# Patient Record
Sex: MALE | Race: WHITE | NOT HISPANIC OR LATINO | Employment: FULL TIME | URBAN - NONMETROPOLITAN AREA
[De-identification: names, ages, dates, MRNs, and addresses within clinical notes are randomized per-mention and may not be internally consistent; named-entity substitution may affect disease eponyms.]

---

## 2017-02-20 ENCOUNTER — COMMUNICATION - GICH (OUTPATIENT)
Dept: FAMILY MEDICINE | Facility: OTHER | Age: 62
End: 2017-02-20

## 2017-02-20 DIAGNOSIS — E78.00 PURE HYPERCHOLESTEROLEMIA: ICD-10-CM

## 2017-03-09 ENCOUNTER — COMMUNICATION - GICH (OUTPATIENT)
Dept: FAMILY MEDICINE | Facility: OTHER | Age: 62
End: 2017-03-09

## 2017-03-09 DIAGNOSIS — E78.00 PURE HYPERCHOLESTEROLEMIA: ICD-10-CM

## 2017-03-09 DIAGNOSIS — Z12.5 ENCOUNTER FOR SCREENING FOR MALIGNANT NEOPLASM OF PROSTATE: ICD-10-CM

## 2017-03-14 ENCOUNTER — AMBULATORY - GICH (OUTPATIENT)
Dept: LAB | Facility: OTHER | Age: 62
End: 2017-03-14

## 2017-03-14 DIAGNOSIS — E78.00 PURE HYPERCHOLESTEROLEMIA: ICD-10-CM

## 2017-03-14 DIAGNOSIS — Z12.5 ENCOUNTER FOR SCREENING FOR MALIGNANT NEOPLASM OF PROSTATE: ICD-10-CM

## 2017-03-14 LAB
A/G RATIO - HISTORICAL: 1.4 (ref 1–2)
ALBUMIN SERPL-MCNC: 4.2 G/DL (ref 3.5–5.7)
ALP SERPL-CCNC: 47 IU/L (ref 34–104)
ALT (SGPT) - HISTORICAL: 20 IU/L (ref 7–52)
ANION GAP - HISTORICAL: 9 (ref 5–18)
AST SERPL-CCNC: 22 IU/L (ref 13–39)
BILIRUB SERPL-MCNC: 2.2 MG/DL (ref 0.3–1)
BUN SERPL-MCNC: 13 MG/DL (ref 7–25)
BUN/CREAT RATIO - HISTORICAL: 15
CALCIUM SERPL-MCNC: 9.3 MG/DL (ref 8.6–10.3)
CHLORIDE SERPLBLD-SCNC: 105 MMOL/L (ref 98–107)
CHOL/HDL RATIO - HISTORICAL: 3.58
CHOLESTEROL TOTAL: 154 MG/DL
CO2 SERPL-SCNC: 24 MMOL/L (ref 21–31)
CREAT SERPL-MCNC: 0.89 MG/DL (ref 0.7–1.3)
GFR IF NOT AFRICAN AMERICAN - HISTORICAL: >60 ML/MIN/1.73M2
GLOBULIN - HISTORICAL: 3 G/DL (ref 2–3.7)
GLUCOSE SERPL-MCNC: 95 MG/DL (ref 70–105)
HDLC SERPL-MCNC: 43 MG/DL (ref 23–92)
LDLC SERPL CALC-MCNC: 95 MG/DL
NON-HDL CHOLESTEROL - HISTORICAL: 111 MG/DL
PATIENT STATUS - HISTORICAL: NORMAL
POTASSIUM SERPL-SCNC: 3.9 MMOL/L (ref 3.5–5.1)
PROT SERPL-MCNC: 7.2 G/DL (ref 6.4–8.9)
PSA TOTAL (DIAGNOSTIC) - HISTORICAL: 1.42 NG/ML
SODIUM SERPL-SCNC: 138 MMOL/L (ref 133–143)
TRIGL SERPL-MCNC: 82 MG/DL

## 2017-03-15 ENCOUNTER — OFFICE VISIT - GICH (OUTPATIENT)
Dept: FAMILY MEDICINE | Facility: OTHER | Age: 62
End: 2017-03-15

## 2017-03-15 ENCOUNTER — HISTORY (OUTPATIENT)
Dept: FAMILY MEDICINE | Facility: OTHER | Age: 62
End: 2017-03-15

## 2017-03-15 DIAGNOSIS — Z00.00 ENCOUNTER FOR GENERAL ADULT MEDICAL EXAMINATION WITHOUT ABNORMAL FINDINGS: ICD-10-CM

## 2017-03-15 DIAGNOSIS — E78.00 PURE HYPERCHOLESTEROLEMIA: ICD-10-CM

## 2017-03-15 DIAGNOSIS — R97.20 ELEVATED PROSTATE SPECIFIC ANTIGEN (PSA): ICD-10-CM

## 2017-03-23 ENCOUNTER — OFFICE VISIT - GICH (OUTPATIENT)
Dept: UROLOGY | Facility: OTHER | Age: 62
End: 2017-03-23

## 2017-03-23 ENCOUNTER — HISTORY (OUTPATIENT)
Dept: UROLOGY | Facility: OTHER | Age: 62
End: 2017-03-23

## 2017-03-23 DIAGNOSIS — R97.20 ELEVATED PROSTATE SPECIFIC ANTIGEN (PSA): ICD-10-CM

## 2017-08-22 ENCOUNTER — COMMUNICATION - GICH (OUTPATIENT)
Dept: UROLOGY | Facility: OTHER | Age: 62
End: 2017-08-22

## 2017-08-22 DIAGNOSIS — R97.20 ELEVATED PROSTATE SPECIFIC ANTIGEN (PSA): ICD-10-CM

## 2017-09-07 ENCOUNTER — AMBULATORY - GICH (OUTPATIENT)
Dept: SCHEDULING | Facility: OTHER | Age: 62
End: 2017-09-07

## 2017-09-20 ENCOUNTER — AMBULATORY - GICH (OUTPATIENT)
Dept: LAB | Facility: OTHER | Age: 62
End: 2017-09-20

## 2017-09-20 DIAGNOSIS — R97.20 ELEVATED PROSTATE SPECIFIC ANTIGEN (PSA): ICD-10-CM

## 2017-09-20 LAB — PSA TOTAL (DIAGNOSTIC) - HISTORICAL: 0.97 NG/ML

## 2017-09-21 ENCOUNTER — OFFICE VISIT - GICH (OUTPATIENT)
Dept: UROLOGY | Facility: OTHER | Age: 62
End: 2017-09-21

## 2017-09-21 ENCOUNTER — HISTORY (OUTPATIENT)
Dept: UROLOGY | Facility: OTHER | Age: 62
End: 2017-09-21

## 2017-09-21 DIAGNOSIS — R97.20 ELEVATED PROSTATE SPECIFIC ANTIGEN (PSA): ICD-10-CM

## 2017-12-27 NOTE — PROGRESS NOTES
Patient Information     Patient Name MRChinedu Luther [id number] Male 1955      Progress Notes by Marcus Mae MD at 2017  2:30 PM     Author:  Marcus Mae MD Service:  (none) Author Type:  Physician     Filed:  2017  4:18 PM Encounter Date:  2017 Status:  Signed     :  Marcus Mae MD (Physician)            Type of Visit  EST    Chief Complaint  Elevated PSA    HPI  Mr. Houser is a 61 y.o. male who follows up with an elevated PSA.  He does not have a family history of prostate cancer.  The patient has not previously undergone prostate biopsy.  No associated worsening LUTS, dysuria or prostatitis at the time of the PSA.  He has been dealing with a URI associated with fevers  The most recent PSA was collected 1 day ago.    In the last 6 months he reports no changes with his health history.  He also reports no changes with his urinary complaints.      Review of Systems  I personally reviewed the ROS with the patient.    Nursing Notes:   Seema Flores  2017  2:19 PM  Signed  Patient presents to the clinic for follow up on psa levels.  Seema Flores LPN........................2017  2:02 PM    Review of Systems:    Weight loss:    No     Recent fever/chills:  No   Night sweats:   No  Current skin rash:  No   Recent hair loss:  No  Heat intolerance:  No   Cold intolerance:  No  Chest pain:   No   Palpitations:   No  Shortness of breath:  No   Wheezing:   No  Constipation:    No   Diarrhea:   No   Nausea:   No   Vomiting:   No   Kidney/side pain:  No   Back pain:   No  Frequent headaches:  No   Dizziness:     No  Leg swelling:   No   Calf pain:    No    Seema Flores LPN........................2017  2:02 PM        Physical Exam  Vitals:     17 1405   BP: 122/68   Pulse: 56   Temp: 98  F (36.7  C)   TempSrc: Temporal     Constitutional: No acute distress.  Alert and cooperative   Head: NCAT  Eyes: Conjunctivae normal  Cardiovascular: Regular  rate.  Pulmonary/Chest: Respirations are even and non-labored bilaterally, no audible wheezing  Abdominal: Soft. No distension, tenderness, masses or guarding.   Neurological: A + O x 3.  Cranial Nerves II-XII grossly intact.  Extremities: SELINA x 4, Warm. No clubbing.  No cyanosis.    Skin: Pink, warm and dry.  No visible rashes noted.  Psychiatric:  Normal mood and affect  Back:  No left CVA tenderness.  No right CVA tenderness.  Genitourinary:  Nonpalpable bladder   prostate exam is normal, 20-30 g, no nodules    Labs  Results for MADAY VINCENT (MRN [id number]) as of 9/21/2017 14:20   7/9/2013 08:10 3/2/2016 09:27 3/14/2017 08:02 9/20/2017 08:35   PSA TOTAL (DIAGNOSTIC) 0.52      PSA TOTAL (DIAGNOSTIC)  0.546 1.418 0.966       CREATININE (mg/dL)    Date Value   03/14/2017 0.89       Assessment  Mr. Vincent is a 61 y.o. male who presents with stable and low PSA.  Reassured patient that his values are excellent.  Explained that we definitely should decrease the frequency of PSA checks.    Plan  PSA in 1 year - he will likely follow up with his primary care physician for this  I am available as needed

## 2017-12-28 NOTE — TELEPHONE ENCOUNTER
Patient Information     Patient Name MRN Chinedu Devine [id number] Male 1955      Telephone Encounter by Seema Flores at 2017  2:27 PM     Author:  Seema Flores Service:  (none) Author Type:  (none)     Filed:  2017  2:28 PM Encounter Date:  2017 Status:  Signed     :  Seema Flores            Needs orders, Pended below.  Seema Flores LPN........................2017  2:27 PM

## 2017-12-30 NOTE — NURSING NOTE
Patient Information     Patient Name MRN Chinedu Devine [id number] Male 1955      Nursing Note by Seema Flores at 2017  2:30 PM     Author:  Seema Flores Service:  (none) Author Type:  (none)     Filed:  2017  2:19 PM Encounter Date:  2017 Status:  Signed     :  Seema Flores            Patient presents to the clinic for follow up on psa levels.  Seema Flores LPN........................2017  2:02 PM    Review of Systems:    Weight loss:    No     Recent fever/chills:  No   Night sweats:   No  Current skin rash:  No   Recent hair loss:  No  Heat intolerance:  No   Cold intolerance:  No  Chest pain:   No   Palpitations:   No  Shortness of breath:  No   Wheezing:   No  Constipation:    No   Diarrhea:   No   Nausea:   No   Vomiting:   No   Kidney/side pain:  No   Back pain:   No  Frequent headaches:  No   Dizziness:     No  Leg swelling:   No   Calf pain:    No    Seema Flores LPN........................2017  2:02 PM

## 2018-01-03 NOTE — TELEPHONE ENCOUNTER
Patient Information     Patient Name MRN Chinedu Devine [id number] Male 1955      Telephone Encounter by Rickey Granda MD at 3/9/2017 12:06 PM     Author:  Rickey Granda MD Service:  (none) Author Type:  Physician     Filed:  3/9/2017 12:06 PM Encounter Date:  3/9/2017 Status:  Signed     :  Rickey Granda MD (Physician)            Labs ordered

## 2018-01-03 NOTE — TELEPHONE ENCOUNTER
Patient Information     Patient Name MRN Sex Chinedu Barcenas [id number] Male 1955      Telephone Encounter by Natalie Cleary at 3/9/2017  9:43 AM     Author:  Natalie Cleary Service:  (none) Author Type:  (none)     Filed:  3/9/2017  9:45 AM Encounter Date:  3/9/2017 Status:  Signed     :  Natalie Cleary            Last Lipids:  Chol: 3/1/2016 153  TG: 3/1/2016 57  HDL: 3/1/2016 45   LDL: 3/1/2016 97  LDL DIRECT: . No results found in past 5 years      SODIUM      Date Value Ref Range Status   2016 138 134 - 143 mmol/L Final     POTASSIUM      Date Value Ref Range Status   2016 4.2 3.5 - 5.1 mmol/L Final     CHLORIDE      Date Value Ref Range Status   2016 104 98 - 107 mmol/L Final     CO2,TOTAL      Date Value Ref Range Status   2016 28 21 - 31 mmol/L Final     ANION GAP      Date Value Ref Range Status   2016 6 5 - 18                 Final     GLUCOSE      Date Value Ref Range Status   2016 97 70 - 105 mg/dL Final     BUN      Date Value Ref Range Status   2016 12 7 - 25 mg/dL Final     CREATININE      Date Value Ref Range Status   2016 0.85 0.70 - 1.30 mg/dL Final     BUN/CREAT RATIO                Date Value Ref Range Status   2016 14                 Final     CALCIUM      Date Value Ref Range Status   2016 9.6 8.6 - 10.3 mg/dL Final     Natalie Cleary LPN....................3/9/2017 9:45 AM

## 2018-01-03 NOTE — TELEPHONE ENCOUNTER
Patient Information     Patient Name MRN Sex Chinedu Barcenas [id number] Male 1955      Telephone Encounter by Maddie Bishop RN at 2017  2:28 PM     Author:  Maddie Bishop RN Service:  (none) Author Type:  NURS- Registered Nurse     Filed:  2017  2:36 PM Encounter Date:  2017 Status:  Signed     :  Maddie Bishop RN (NURS- Registered Nurse)            Statins    Office visit in the past 12 months.    Last visit with NOELLE WESTON was on: 2016 in [facility]  Next visit with NOELLE WESTON is on: No future appointment listed with this provider  Next visit with Family Practice is on: No future appointment listed in this department    Last Lipids:  Chol: 153    3/1/2016  T    3/1/2016  HDL:   45    3/1/2016  LDL:  97    3/1/2016  LDL DIRECT:  No results found in past 5 years    .    Max refills 12 months from last office visit.      Patient is due for medication management appointment. Limited refill provided at this time and letter sent for reminder to patient. Prescription refilled per RN Medication Refill Policy.................... Maddie Bishop RN ....................  2017   2:29 PM

## 2018-01-03 NOTE — TELEPHONE ENCOUNTER
Patient Information     Patient Name MRN Chinedu Devine [id number] Male 1955      Telephone Encounter by Jocelyn Woo at 3/9/2017 12:40 PM     Author:  Jocelyn Woo Service:  (none) Author Type:  (none)     Filed:  3/9/2017 12:40 PM Encounter Date:  3/9/2017 Status:  Signed     :  Jocelyn Woo            Patient notified  Jocelyn Woo LPN     3/9/2017 12:40 PM

## 2018-01-03 NOTE — PROGRESS NOTES
"Patient Information     Patient Name MRN Sex Chinedu Barcenas [id number] Male 1955      Progress Notes by Rickey Granda MD at 3/15/2017  8:15 AM     Author:  Rickey Granda MD Service:  (none) Author Type:  Physician     Filed:  3/16/2017  8:27 AM Encounter Date:  3/15/2017 Status:  Signed     :  Rickey Granda MD (Physician)            SUBJECTIVE:    Chinedu Houser is a 61 y.o. male who presents for physical    HPI Comments: Patient arrives here for a physical. He has questions about screening tests for cardiovascular disease. Otherwise no other complaints.      No Known Allergies,   Family History       Problem   Relation Age of Onset     Heart Disease  Father 62     MI       Good Health  Mother      Heart Disease  Brother 50     Heart disease with stenting        Heart Disease  Brother 50     Heart disease with stenting      ,   Past Surgical History       Procedure   Laterality Date     Knee arthroscopy         both knees       Tonsil and adenoidectomy        T & A       Colonoscopy screening        Colonoscopy screening   2013     Normal - follow up 10 years     ,   Social History      Substance Use Topics        Smoking status:  Former Smoker     Quit date: 1985     Smokeless tobacco:  Never Used     Alcohol use  No    and   Social History      Substance Use Topics        Smoking status:  Former Smoker     Quit date: 1985     Smokeless tobacco:  Never Used     Alcohol use  No       REVIEW OF SYSTEMS:  Review of Systems   Constitutional: Negative for chills and fever.   Cardiovascular: Negative for chest pain, palpitations, orthopnea and claudication.   Genitourinary: Negative for dysuria and urgency.       OBJECTIVE:  /72  Ht 1.759 m (5' 9.25\")  Wt 93.2 kg (205 lb 6.4 oz)  BMI 30.11 kg/m2    EXAM:   Physical Exam   Constitutional: He is oriented to person, place, and time and well-developed, well-nourished, and in no distress.   HENT:   Head: " Normocephalic and atraumatic.   Right Ear: External ear normal.   Left Ear: External ear normal.   Mouth/Throat: No oropharyngeal exudate.   Eyes: Pupils are equal, round, and reactive to light.   Cardiovascular: Normal rate, regular rhythm and normal heart sounds.    No murmur heard.  Pulmonary/Chest: Effort normal and breath sounds normal.   Genitourinary: Prostate normal and penis normal.   Musculoskeletal: Normal range of motion. He exhibits no edema.   Neurological: He is oriented to person, place, and time.   Psychiatric: Affect normal.       ASSESSMENT/PLAN:    ICD-10-CM    1. Physical exam Z00.00    2. HYPERCHOLESTEROLEMIA E78.00 simvastatin (ZOCOR) 10 mg tablet   3. Elevated PSA R97.20 AMB CONSULT TO UROLOGY        Plan:  Laboratory reviewed with the patient. Of note his PSA has been stable over the last few years at 0.5 to an appointment 0.546. It did go up to 1.4 this visit. Will have patient see urology because of the markedly increased.  Also discussed screening cardiovascular testing including stress test CT scan calcium scoring. Benefits and risks discussed. Patient is not having any symptoms. At this time we'll just monitor.

## 2018-01-03 NOTE — TELEPHONE ENCOUNTER
Patient Information     Patient Name MRN Chinedu Devine [id number] Male 1955      Telephone Encounter by Paulette Bustamante at 3/9/2017  9:37 AM     Author:  Paulette Bustamante Service:  (none) Author Type:  (none)     Filed:  3/9/2017  9:38 AM Encounter Date:  3/9/2017 Status:  Signed     :  Paulette Bustamante            MBL - PT IS REQUESTING LAB ORDERS PRIOR TO PX APPT Wednesday 03/15.  PLEASE CALL PT IN REGARDS TO THIS CONCERN        Paulette OBREGON ....................  3/9/2017   9:37 AM

## 2018-01-04 NOTE — PROGRESS NOTES
Patient Information     Patient Name MRChinedu Luther [id number] Male 1955      Progress Notes by Marcus Mae MD at 3/23/2017  9:15 AM     Author:  Marcus Mae MD Service:  (none) Author Type:  Physician     Filed:  3/23/2017 10:54 AM Encounter Date:  3/23/2017 Status:  Signed     :  Marcus Mae MD (Physician)            I was asked to see this patient by Dr Granda and provide my opinion about the following:  Elevated PSA    Type of Visit  Consult    Chief Complaint  Elevated PSA    HPI  Mr. Houser is a 61 y.o. male who presents with an elevated PSA.  He does not have a family history of prostate cancer.  The patient has not previously undergone prostate biopsy.  No associated worsening LUTS, dysuria or prostatitis at the time of the PSA.  He has been dealing with a URI associated with fevers  The most recent PSA was collected 1 week(s) ago.      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    Medications  He has a current medication list which includes the following prescription(s): aspirin, omega-3 fatty acids-vitamin e, and simvastatin.    Allergies  No Known Allergies    Social History  He  reports that he quit smoking about 32 years ago. He has never used smokeless tobacco. He reports that he does not drink alcohol or use illicit drugs.  No drug abuse.    Family History  Family History       Problem   Relation Age of Onset     Heart Disease  Father 62     MI       Good Health   "Mother      Heart Disease  Brother 50     Heart disease with stenting        Heart Disease  Brother 50     Heart disease with stenting          Review of Systems  I personally reviewed the ROS with the patient.    Nursing Notes:   Christina Bullard RN  3/23/2017  9:16 AM  Signed  Review of Systems:    Weight loss:    No     Recent fever/chills:  Yes   Night sweats:   Yes  Current skin rash:  No   Recent hair loss:  No  Heat intolerance:  No   Cold intolerance:  No  Chest pain:   No   Palpitations:   No  Shortness of breath:  No   Wheezing:   No  Constipation:    No   Diarrhea:   No   Nausea:   No   Vomiting:   No   Kidney/side pain:  No   Back pain:   No  Frequent headaches:  Yes   Dizziness:     No  Leg swelling:   No   Calf pain:    No    Parents, brothers or sisters with history of kidney cancer?   No  Parents, brothers or sisters with history of bladder cancer: No    Physical Exam  Vitals:     03/23/17 0913   BP: 138/78   Pulse: 56   Resp: 16   Weight: 92.7 kg (204 lb 6.4 oz)   Height: 1.759 m (5' 9.25\")     Constitutional: No acute distress.  Alert and cooperative   Head: NCAT  Eyes: Conjunctivae normal  Cardiovascular: Regular rate.  Pulmonary/Chest: Respirations are even and non-labored bilaterally, no audible wheezing  Abdominal: Soft. No distension, tenderness, masses or guarding.   Neurological: A + O x 3.  Cranial Nerves II-XII grossly intact.  Extremities: SELINA x 4, Warm. No clubbing.  No cyanosis.    Skin: Pink, warm and dry.  No visible rashes noted.  Psychiatric:  Normal mood and affect  Back:  No left CVA tenderness.  No right CVA tenderness.  Genitourinary:  Nonpalpable bladder   prostate exam is normal, 20-30 g, no nodules    Labs  PSA TOTAL (DIAGNOSTIC) (ng/mL)    Date Value   03/14/2017 1.418   07/09/2013 0.52     CREATININE (mg/dL)    Date Value   03/14/2017 0.89       Assessment  Mr. Houser is a 61 y.o. male who presents with elevated PSA.  Discussed the risks of biopsy leading to " overdiagnosis and overtreatment.  I explained to the patient that an elevated PSA is a marker of risk of prostate cancer and a prostate biopsy oftentimes is the next step if diagnosis is the goal.  I explained that sampling error can occur with any biopsy and there is a risk of potentially missing a cancer that may be present.  His PSA has increased significantly on a percentage basis however his overall value is still quite low.  I explained how trending this value over time will help delineate an appropriate plan of observation versus biopsy.    He has recently experienced an upper respiratory tract infection which has resulted in some systemic symptoms of malaise and fever.  It is possible this caused a false positive increase in his PSA    Plan  PSA in 6 months

## 2018-01-04 NOTE — NURSING NOTE
Patient Information     Patient Name MRN Chinedu Devine [id number] Male 1955      Nursing Note by Christina Bullard RN at 3/23/2017  9:15 AM     Author:  Christina Bullard RN Service:  (none) Author Type:  NURS- Registered Nurse     Filed:  3/23/2017  9:16 AM Encounter Date:  3/23/2017 Status:  Signed     :  Christina Bullard RN (NURS- Registered Nurse)            Review of Systems:    Weight loss:    No     Recent fever/chills:  Yes   Night sweats:   Yes  Current skin rash:  No   Recent hair loss:  No  Heat intolerance:  No   Cold intolerance:  No  Chest pain:   No   Palpitations:   No  Shortness of breath:  No   Wheezing:   No  Constipation:    No   Diarrhea:   No   Nausea:   No   Vomiting:   No   Kidney/side pain:  No   Back pain:   No  Frequent headaches:  Yes   Dizziness:     No  Leg swelling:   No   Calf pain:    No    Parents, brothers or sisters with history of kidney cancer?   No  Parents, brothers or sisters with history of bladder cancer: No

## 2018-01-22 ENCOUNTER — COMMUNICATION - GICH (OUTPATIENT)
Dept: FAMILY MEDICINE | Facility: OTHER | Age: 63
End: 2018-01-22

## 2018-01-22 DIAGNOSIS — E78.00 PURE HYPERCHOLESTEROLEMIA: ICD-10-CM

## 2018-01-22 DIAGNOSIS — Z13.1 ENCOUNTER FOR SCREENING FOR DIABETES MELLITUS: ICD-10-CM

## 2018-01-25 VITALS — TEMPERATURE: 98 F | DIASTOLIC BLOOD PRESSURE: 68 MMHG | SYSTOLIC BLOOD PRESSURE: 122 MMHG | HEART RATE: 56 BPM

## 2018-01-26 VITALS
SYSTOLIC BLOOD PRESSURE: 138 MMHG | RESPIRATION RATE: 16 BRPM | WEIGHT: 204.4 LBS | HEART RATE: 56 BPM | HEIGHT: 69 IN | DIASTOLIC BLOOD PRESSURE: 78 MMHG | BODY MASS INDEX: 30.27 KG/M2

## 2018-01-26 VITALS
SYSTOLIC BLOOD PRESSURE: 132 MMHG | HEIGHT: 69 IN | BODY MASS INDEX: 30.42 KG/M2 | DIASTOLIC BLOOD PRESSURE: 72 MMHG | WEIGHT: 205.4 LBS

## 2018-01-30 ENCOUNTER — DOCUMENTATION ONLY (OUTPATIENT)
Dept: FAMILY MEDICINE | Facility: OTHER | Age: 63
End: 2018-01-30

## 2018-01-30 PROBLEM — R17 JAUNDICE, NON-NEONATAL: Status: ACTIVE | Noted: 2018-01-30

## 2018-01-30 PROBLEM — E78.00 HYPERCHOLESTEROLEMIA: Status: ACTIVE | Noted: 2018-01-30

## 2018-01-30 PROBLEM — R97.20 ELEVATED PSA: Status: ACTIVE | Noted: 2017-03-15

## 2018-01-30 RX ORDER — METHYLPREDNISOLONE 4 MG
1500 TABLET, DOSE PACK ORAL
COMMUNITY
Start: 2017-09-21

## 2018-01-30 RX ORDER — ASPIRIN 81 MG/1
81 TABLET ORAL
COMMUNITY
Start: 2013-12-02 | End: 2020-10-29

## 2018-01-30 RX ORDER — SIMVASTATIN 10 MG
10 TABLET ORAL AT BEDTIME
COMMUNITY
Start: 2017-03-15 | End: 2018-05-05

## 2018-01-30 RX ORDER — CHLORAL HYDRATE 500 MG
CAPSULE ORAL
COMMUNITY
Start: 2013-12-02

## 2018-02-13 NOTE — TELEPHONE ENCOUNTER
Patient Information     Patient Name MRN Chinedu Devine [id number] Male 1955      Telephone Encounter by Susy Prince at 2018  2:31 PM     Author:  Susy Prince Service:  (none) Author Type:  (none)     Filed:  2018  2:32 PM Encounter Date:  2018 Status:  Signed     :  Susy Prince            Patient requesting lab work in advance of physical, scheduled for 3-16-18.

## 2018-03-08 ENCOUNTER — TELEPHONE (OUTPATIENT)
Dept: FAMILY MEDICINE | Facility: OTHER | Age: 63
End: 2018-03-08

## 2018-03-08 DIAGNOSIS — E78.00 HYPERCHOLESTEROLEMIA: Primary | ICD-10-CM

## 2018-03-08 DIAGNOSIS — Z13.1 SCREENING FOR DIABETES MELLITUS: ICD-10-CM

## 2018-03-08 DIAGNOSIS — Z00.00 PHYSICAL EXAM: ICD-10-CM

## 2018-03-09 PROBLEM — Z13.1 SCREENING FOR DIABETES MELLITUS: Status: ACTIVE | Noted: 2018-03-09

## 2018-03-16 ENCOUNTER — OFFICE VISIT (OUTPATIENT)
Dept: FAMILY MEDICINE | Facility: OTHER | Age: 63
End: 2018-03-16
Attending: FAMILY MEDICINE
Payer: COMMERCIAL

## 2018-03-16 VITALS
DIASTOLIC BLOOD PRESSURE: 64 MMHG | HEART RATE: 52 BPM | SYSTOLIC BLOOD PRESSURE: 126 MMHG | BODY MASS INDEX: 28.47 KG/M2 | HEIGHT: 69 IN | WEIGHT: 192.2 LBS

## 2018-03-16 DIAGNOSIS — Z00.00 ROUTINE GENERAL MEDICAL EXAMINATION AT A HEALTH CARE FACILITY: Primary | ICD-10-CM

## 2018-03-16 DIAGNOSIS — R97.20 ELEVATED PSA: ICD-10-CM

## 2018-03-16 DIAGNOSIS — Z00.00 PHYSICAL EXAM: ICD-10-CM

## 2018-03-16 DIAGNOSIS — E78.00 HYPERCHOLESTEROLEMIA: ICD-10-CM

## 2018-03-16 DIAGNOSIS — Z13.1 SCREENING FOR DIABETES MELLITUS: ICD-10-CM

## 2018-03-16 LAB
ALBUMIN SERPL-MCNC: 4.6 G/DL (ref 3.5–5.7)
ALP SERPL-CCNC: 49 U/L (ref 34–104)
ALT SERPL W P-5'-P-CCNC: 20 U/L (ref 7–52)
ANION GAP SERPL CALCULATED.3IONS-SCNC: 5 MMOL/L (ref 3–14)
AST SERPL W P-5'-P-CCNC: 23 U/L (ref 13–39)
BILIRUB SERPL-MCNC: 1.6 MG/DL (ref 0.3–1)
BUN SERPL-MCNC: 11 MG/DL (ref 7–25)
CALCIUM SERPL-MCNC: 9.8 MG/DL (ref 8.6–10.3)
CHLORIDE SERPL-SCNC: 103 MMOL/L (ref 98–107)
CHOLEST SERPL-MCNC: 140 MG/DL
CO2 SERPL-SCNC: 30 MMOL/L (ref 21–31)
CREAT SERPL-MCNC: 0.76 MG/DL (ref 0.7–1.3)
GFR SERPL CREATININE-BSD FRML MDRD: >90 ML/MIN/1.7M2
GLUCOSE SERPL-MCNC: 101 MG/DL (ref 70–105)
HDLC SERPL-MCNC: 45 MG/DL (ref 23–92)
LDLC SERPL CALC-MCNC: 83 MG/DL
NONHDLC SERPL-MCNC: 95 MG/DL
POTASSIUM SERPL-SCNC: 4.3 MMOL/L (ref 3.5–5.1)
PROT SERPL-MCNC: 7.4 G/DL (ref 6.4–8.9)
PSA SERPL-MCNC: 1.22 NG/ML
SODIUM SERPL-SCNC: 138 MMOL/L (ref 134–144)
TRIGL SERPL-MCNC: 60 MG/DL

## 2018-03-16 PROCEDURE — 36415 COLL VENOUS BLD VENIPUNCTURE: CPT | Performed by: FAMILY MEDICINE

## 2018-03-16 PROCEDURE — 80053 COMPREHEN METABOLIC PANEL: CPT | Performed by: FAMILY MEDICINE

## 2018-03-16 PROCEDURE — 84153 ASSAY OF PSA TOTAL: CPT | Performed by: FAMILY MEDICINE

## 2018-03-16 PROCEDURE — 80061 LIPID PANEL: CPT | Performed by: FAMILY MEDICINE

## 2018-03-16 PROCEDURE — 99396 PREV VISIT EST AGE 40-64: CPT | Performed by: FAMILY MEDICINE

## 2018-03-16 ASSESSMENT — PATIENT HEALTH QUESTIONNAIRE - PHQ9: 5. POOR APPETITE OR OVEREATING: NOT AT ALL

## 2018-03-16 ASSESSMENT — ANXIETY QUESTIONNAIRES
2. NOT BEING ABLE TO STOP OR CONTROL WORRYING: NOT AT ALL
7. FEELING AFRAID AS IF SOMETHING AWFUL MIGHT HAPPEN: NOT AT ALL
1. FEELING NERVOUS, ANXIOUS, OR ON EDGE: NOT AT ALL
6. BECOMING EASILY ANNOYED OR IRRITABLE: NOT AT ALL
5. BEING SO RESTLESS THAT IT IS HARD TO SIT STILL: NOT AT ALL

## 2018-03-16 ASSESSMENT — PAIN SCALES - GENERAL: PAINLEVEL: NO PAIN (0)

## 2018-03-16 NOTE — MR AVS SNAPSHOT
"              After Visit Summary   3/16/2018    Chinedu Houser    MRN: [id number]           Patient Information     Date Of Birth          1955        Visit Information        Provider Department      3/16/2018 9:00 AM Rickey Granda MD [facility]        Today's Diagnoses     Physical exam    -  1    Routine general medical examination at a health care facility        Hypercholesterolemia        Elevated PSA        Screening for diabetes mellitus           Follow-ups after your visit        Who to contact     If you have questions or need follow up information about today's clinic visit or your schedule please contact [facility] directly at [phone].  Normal or non-critical lab and imaging results will be communicated to you by [employer], letter or phone within 4 business days after the clinic has received the results. If you do not hear from us within 7 days, please contact the clinic through [employer] or phone. If you have a critical or abnormal lab result, we will notify you by phone as soon as possible.  Submit refill requests through [employer] or call your pharmacy and they will forward the refill request to us. Please allow 3 business days for your refill to be completed.          Additional Information About Your Visit        MyChart Information     [employer] lets you send messages to your doctor, view your test results, renew your prescriptions, schedule appointments and more. To sign up, go to [email or web address] . Click on \"Log in\" on the left side of the screen, which will take you to the Welcome page. Then click on \"Sign up Now\" on the right side of the page.     You will be asked to enter the access code listed below, as well as some personal information. Please follow the directions to create your username and password.     Your access code is: [email or web address]  Expires: 2018  9:59 AM     Your access code will  in 90 days. If you need help " "or a new code, please call your [facility] clinic or [phone].        Care EveryWhere ID     This is your Care EveryWhere ID. This could be used by other organizations to access your [facility] medical records  [id number]        Your Vitals Were     Pulse Height BMI (Body Mass Index)             52 5' 9\" (1.753 m) 28.38 kg/m2          Blood Pressure from Last 3 Encounters:   03/16/18 126/64   09/21/17 122/68   03/23/17 138/78    Weight from Last 3 Encounters:   03/16/18 192 lb 3.2 oz (87.2 kg)   03/23/17 204 lb 6.4 oz (92.7 kg)   03/15/17 205 lb 6.4 oz (93.2 kg)              Today, you had the following     No orders found for display       Primary Care Provider Office Phone # Fax #    Rickey Granda [phone]       [address] [facility] [address]        Equal Access to Services     [facility]: Hadii aad ku hadasho Soomaali, waaxda luqadaha, qaybta kaalmada adeegyada, waxshamar squires . So [facility] [phone].    ATENCIÓN: Si habla español, tiene a hedrick disposición servicios gratuitos de asistencia lingüística. Llame al [phone].    We comply with applicable federal civil rights laws and [place] laws. We do not discriminate on the basis of race, color, national origin, age, disability, sex, sexual orientation, or gender identity.            Thank you!     Thank you for choosing [facility] AND [facility]  for your care. Our goal is always to provide you with excellent care. Hearing back from our patients is one way we can continue to improve our services. Please take a few minutes to complete the written survey that you may receive in the mail after your visit with us. Thank you!             Your Updated Medication List - Protect others around you: Learn how to safely use, store and throw away your medicines at www.disposemymeds.org.          This list is accurate as of 3/16/18  9:59 AM.  Always use your most recent med list.                   " Brand Name Dispense Instructions for use Diagnosis    aspirin EC 81 MG EC tablet      Take 81 mg by mouth daily with food        fish oil-omega-3 fatty acids 1000 MG capsule           Glucosamine Sulfate 500 MG Tabs      Patient unsure of dose.        simvastatin 10 MG tablet    ZOCOR     Take 10 mg by mouth At Bedtime

## 2018-03-16 NOTE — NURSING NOTE
Patient here for yearly physical, last eye exam sept 2017 and last dental 6 weeks prior. No concerns today. Jocelyn Woo LPN .......................3/16/2018  9:09 AM

## 2018-03-16 NOTE — LETTER
March 19, 2018      Chinedu Houser  [address]  [place] [address]        Dear ,    We are writing to inform you of your test results.    Your test results fall within the expected range(s) or remain unchanged from previous results.  Please continue with current treatment plan.    Resulted Orders   Comprehensive metabolic panel (BMP + Alb, Alk Phos, ALT, AST, Total. Bili, TP)   Result Value Ref Range    Sodium 138 134 - 144 mmol/L    Potassium 4.3 3.5 - 5.1 mmol/L    Chloride 103 98 - 107 mmol/L    Carbon Dioxide 30 21 - 31 mmol/L    Anion Gap 5 3 - 14 mmol/L    Glucose 101 70 - 105 mg/dL    Urea Nitrogen 11 7 - 25 mg/dL    Creatinine 0.76 0.70 - 1.30 mg/dL    GFR Estimate >90 >60 mL/min/1.7m2    GFR Estimate If Black >90 >60 mL/min/1.7m2    Calcium 9.8 8.6 - 10.3 mg/dL    Bilirubin Total 1.6 (H) 0.3 - 1.0 mg/dL    Albumin 4.6 3.5 - 5.7 g/dL    Protein Total 7.4 6.4 - 8.9 g/dL    Alkaline Phosphatase 49 34 - 104 U/L    ALT 20 7 - 52 U/L    AST 23 13 - 39 U/L   **Lipid Profile (Chol, Trig, HDL, LDL calc) anytime - FASTING   Result Value Ref Range    Cholesterol 140 <200 mg/dL    Triglycerides 60 <150 mg/dL    HDL Cholesterol 45 23 - 92 mg/dL    LDL Cholesterol Calculated 83 <100 mg/dL      Comment:      Desirable:       <100 mg/dl    Non HDL Cholesterol 95 <130 mg/dL   PSA GH   Result Value Ref Range    Prostate Specific Antigen 1.222 <3.100 ng/mL       If you have any questions or concerns, please call the clinic at the number listed above.       Sincerely,        Rickey Granda MD

## 2018-03-17 ASSESSMENT — PATIENT HEALTH QUESTIONNAIRE - PHQ9: SUM OF ALL RESPONSES TO PHQ QUESTIONS 1-9: 0

## 2018-03-18 PROBLEM — Z00.00 ROUTINE GENERAL MEDICAL EXAMINATION AT A HEALTH CARE FACILITY: Status: ACTIVE | Noted: 2018-03-18

## 2018-03-18 NOTE — PROGRESS NOTES
"  SUBJECTIVE:   Chinedu Houser is a 62 year old male who presents to clinic today for the following health issues: Physical    HPI Comments: Patient arrives here for physical.  Does not offer any concerns or complaints.    Physical                 Patient Active Problem List    Diagnosis Date Noted     Routine general medical examination at a health care facility 2018     Priority: Medium     Screening for diabetes mellitus 2018     Priority: Medium     Jaundice, non- 2018     Priority: Medium     Overview:   History of elevated total bilirubin, likely Gilbert's disease.       Hypercholesterolemia 2018     Priority: Medium     Elevated PSA 03/15/2017     Priority: Medium     Physical exam 2016     Priority: Medium     Apnea spell 10/14/2014     Priority: Medium     Disorder of bursae and tendons in shoulder region 2012     Priority: Medium     Arthrodesis status 08/10/2011     Priority: Medium     Personal history of other diseases of digestive system 08/10/2011     Priority: Medium     Contact dermatitis and eczema 08/10/2011     Priority: Medium     No past medical history on file.   Family History   Problem Relation Age of Onset     HEART DISEASE Father 62     Heart Disease,MI     Family History Negative Mother      Good Health     HEART DISEASE Brother 50     Heart Disease,Heart disease with stenting     HEART DISEASE Brother 50     Heart Disease,Heart disease with stenting     Current Outpatient Prescriptions   Medication Sig Dispense Refill     aspirin EC 81 MG EC tablet Take 81 mg by mouth daily with food       Glucosamine Sulfate 500 MG TABS Patient unsure of dose.       fish oil-omega-3 fatty acids 1000 MG capsule        simvastatin (ZOCOR) 10 MG tablet Take 10 mg by mouth At Bedtime       No Known Allergies    Review of Systems     OBJECTIVE:     /64  Pulse 52  Ht 5' 9\" (1.753 m)  Wt 192 lb 3.2 oz (87.2 kg)  BMI 28.38 kg/m2  Body mass index is 28.38 " kg/(m^2).  Physical Exam   Constitutional: He is oriented to person, place, and time. He appears well-developed.   HENT:   Head: Normocephalic and atraumatic.   Right Ear: External ear normal.   Left Ear: External ear normal.   Eyes: Conjunctivae are normal.   Neck: Normal range of motion.   Cardiovascular: Normal rate, regular rhythm and normal heart sounds.    No murmur heard.  Pulmonary/Chest: Breath sounds normal.   Abdominal: Soft. Bowel sounds are normal.   Genitourinary: Rectum normal and penis normal.   Musculoskeletal: Normal range of motion.   Neurological: He is alert and oriented to person, place, and time.   Psychiatric: He has a normal mood and affect.       Diagnostic Test Results:  Results for orders placed or performed in visit on 03/16/18   Comprehensive metabolic panel (BMP + Alb, Alk Phos, ALT, AST, Total. Bili, TP)   Result Value Ref Range    Sodium 138 134 - 144 mmol/L    Potassium 4.3 3.5 - 5.1 mmol/L    Chloride 103 98 - 107 mmol/L    Carbon Dioxide 30 21 - 31 mmol/L    Anion Gap 5 3 - 14 mmol/L    Glucose 101 70 - 105 mg/dL    Urea Nitrogen 11 7 - 25 mg/dL    Creatinine 0.76 0.70 - 1.30 mg/dL    GFR Estimate >90 >60 mL/min/1.7m2    GFR Estimate If Black >90 >60 mL/min/1.7m2    Calcium 9.8 8.6 - 10.3 mg/dL    Bilirubin Total 1.6 (H) 0.3 - 1.0 mg/dL    Albumin 4.6 3.5 - 5.7 g/dL    Protein Total 7.4 6.4 - 8.9 g/dL    Alkaline Phosphatase 49 34 - 104 U/L    ALT 20 7 - 52 U/L    AST 23 13 - 39 U/L   **Lipid Profile (Chol, Trig, HDL, LDL calc) anytime - FASTING   Result Value Ref Range    Cholesterol 140 <200 mg/dL    Triglycerides 60 <150 mg/dL    HDL Cholesterol 45 23 - 92 mg/dL    LDL Cholesterol Calculated 83 <100 mg/dL    Non HDL Cholesterol 95 <130 mg/dL   PSA GH   Result Value Ref Range    Prostate Specific Antigen 1.222 <3.100 ng/mL       ASSESSMENT/PLAN:         1. Routine general medical examination at a health care facility      2. Hypercholesterolemia  Satisfactory  - Comprehensive  metabolic panel (BMP + Alb, Alk Phos, ALT, AST, Total. Bili, TP)  - **Lipid Profile (Chol, Trig, HDL, LDL calc) anytime - FASTING    3. Elevated PSA  Stable  - PSA GH    4. Screening for diabetes mellitus  Negative  - Comprehensive metabolic panel (BMP + Alb, Alk Phos, ALT, AST, Total. Bili, TP)    5. Physical exam  Also discussed zoster immunization.  Patient will look into it with his insurance covers it.  Pneumonia shots at 63.  We discussed the pros and cons for PSAs  - Comprehensive metabolic panel (BMP + Alb, Alk Phos, ALT, AST, Total. Bili, TP)      Rickey Granda MD  [facility]

## 2018-05-05 DIAGNOSIS — E78.00 HYPERCHOLESTEROLEMIA: Primary | ICD-10-CM

## 2018-05-09 RX ORDER — SIMVASTATIN 10 MG
TABLET ORAL
Qty: 90 TABLET | Refills: 2 | Status: SHIPPED | OUTPATIENT
Start: 2018-05-09 | End: 2019-03-16

## 2018-05-09 NOTE — TELEPHONE ENCOUNTER
Last OV 3/16/18 with PCP. Prescription approved per [facility] Refill Protocol. Refill authorized for 90 days and 2 refills at this time. Margot Lunsford RN .............. 5/9/2018  10:43 AM

## 2018-07-23 NOTE — PROGRESS NOTES
Patient Information     Patient Name  Chinedu Houser MRN  [id number] Sex  Male   1955      Letter by Rickey Granda MD at      Author:  Rickey Granda MD Service:  (none) Author Type:  (none)    Filed:   Encounter Date:  2017 Status:  (Other)           Chinedu Houser  [address] [address]          2017    Dear Mr. Houser:    This letter is to remind you that you are due for your annual exam with Rickey Granda MD. Your last comprehensive visit was more than 12 months ago.    A LIMITED refill of simvastatin (ZOCOR) 10 mg tablet has been called into your pharmacy. Additional refills require you to complete this appointment.    Please call the clinic at [phone] to schedule your appointment.    Thank you for choosing [facility] and [facility] for your health care needs.    Sincerely,    Refill RN  [facility]

## 2018-07-24 NOTE — PROGRESS NOTES
Patient Information     Patient Name  Chinedu Houser MRN  [id number] Sex  Male   1955      Letter by Rickey Granda MD at      Author:  Rickey Granda MD Service:  (none) Author Type:  (none)    Filed:   Encounter Date:  3/14/2017 Status:  (Other)           Chinedu Houser  [address] [address]          2017    Dear Mr. Houser:    Enclosed is a copy of your laboratory for your records. Please call if you should have any questions.  Results for orders placed or performed in visit on 17       PSA TOTAL (DIAGNOSTIC)       Result  Value Ref Range Status    PSA TOTAL (DIAGNOSTIC) 1.418 <=3.100 ng/mL Final   COMP METABOLIC PANEL       Result  Value Ref Range Status    SODIUM 138 133 - 143 mmol/L Final    POTASSIUM 3.9 3.5 - 5.1 mmol/L Final    CHLORIDE 105 98 - 107 mmol/L Final    CO2,TOTAL 24 21 - 31 mmol/L Final    ANION GAP 9 5 - 18                 Final    GLUCOSE 95 70 - 105 mg/dL Final    CALCIUM 9.3 8.6 - 10.3 mg/dL Final    BUN 13 7 - 25 mg/dL Final    CREATININE 0.89 0.70 - 1.30 mg/dL Final    BUN/CREAT RATIO           15                 Final    GFR if African American >60 >60 ml/min/1.73m2 Final    GFR if not African American >60 >60 ml/min/1.73m2 Final    ALBUMIN 4.2 3.5 - 5.7 g/dL Final    PROTEIN,TOTAL 7.2 6.4 - 8.9 g/dL Final    GLOBULIN                  3.0 2.0 - 3.7 g/dL Final    A/G RATIO 1.4 1.0 - 2.0                 Final    BILIRUBIN,TOTAL 2.2 (H) 0.3 - 1.0 mg/dL Final    ALK PHOSPHATASE 47 34 - 104 IU/L Final    ALT (SGPT) 20 7 - 52 IU/L Final    AST (SGOT) 22 13 - 39 IU/L Final   LIPID PANEL       Result  Value Ref Range Status    CHOLESTEROL,TOTAL 154 <200 mg/dL Final    TRIGLYCERIDES 82 <150 mg/dL Final    HDL CHOLESTEROL 43 23 - 92 mg/dL Final    NON-HDL CHOLESTEROL 111 <145 mg/dl Final    CHOL/HDL RATIO            3.58 <4.50                 Final    LDL CHOLESTEROL 95 <100 mg/dL Final    PATIENT STATUS            NOT GIVEN                  Final     Rickey Granda MD ....................  3/20/2017   10:29 AM

## 2019-03-16 DIAGNOSIS — E78.00 HYPERCHOLESTEROLEMIA: ICD-10-CM

## 2019-03-16 NOTE — LETTER
March 18, 2019      Chinedu Houser  [address] [address]        Dear Chinedu,     This letter is to remind you that you are over-due for your annual exam and labs, with Rickey Granda. Your last comprehensive visit was more than 12 months ago.    A LIMITED refill of Simvastatin 10 mg tablet has been called into your pharmacy. Additional refills require you to complete this appointment.    Please call the clinic at [phone] to schedule your appointment.    If you should require additional refills before your scheduled appointment, please contact your pharmacy and we will refill your medication until the date of your appointment.    If you are no longer seeing Rickey Granda for primary care, please call to let us know. Doing so will remove you from our call/contact list.      Thank you for choosing [facility] for your health care needs.    Sincerely,    Refill ZOILA  [facility]

## 2019-03-18 RX ORDER — SIMVASTATIN 10 MG
TABLET ORAL
Qty: 90 TABLET | Refills: 0 | Status: SHIPPED | OUTPATIENT
Start: 2019-03-18 | End: 2019-04-09

## 2019-03-18 NOTE — TELEPHONE ENCOUNTER
[id number] in [place], sent Rx request for the following:      SIMVASTATIN 10 MG TABLET  Sig: TAKE 1 TABLET BY MOUTH AT BEDTIME.  Last Prescription Date:   5/9/18  Last Fill Qty/Refills:         90, R-2    Last Office Visit:              3/6/18 (Annual Px)  Future Office visit:           None.    Statins Protocol Failed3/18 4:46 PM   LDL on file in past 12 months    Recent (12 mo) or future (30 days) visit within the authorizing provider's specialty     Patient was due for annual physical and labs, around 3/6/19. Reminder letter sent to Patient. Prescription approved per [facility] Refill Protocol for 90 day lisa refill. Margot Lunsford RN .............. 3/18/2019  4:49 PM

## 2019-04-09 ENCOUNTER — OFFICE VISIT (OUTPATIENT)
Dept: FAMILY MEDICINE | Facility: CLINIC | Age: 64
End: 2019-04-09
Payer: COMMERCIAL

## 2019-04-09 VITALS
OXYGEN SATURATION: 98 % | BODY MASS INDEX: 29.62 KG/M2 | HEART RATE: 50 BPM | HEIGHT: 69 IN | DIASTOLIC BLOOD PRESSURE: 76 MMHG | RESPIRATION RATE: 20 BRPM | TEMPERATURE: 98.3 F | WEIGHT: 200 LBS | SYSTOLIC BLOOD PRESSURE: 144 MMHG

## 2019-04-09 DIAGNOSIS — E78.00 HYPERCHOLESTEROLEMIA: ICD-10-CM

## 2019-04-09 DIAGNOSIS — Z11.4 SCREENING FOR HIV (HUMAN IMMUNODEFICIENCY VIRUS): ICD-10-CM

## 2019-04-09 DIAGNOSIS — R03.0 ELEVATED BP WITHOUT DIAGNOSIS OF HYPERTENSION: ICD-10-CM

## 2019-04-09 DIAGNOSIS — Z11.59 ENCOUNTER FOR HEPATITIS C SCREENING TEST FOR LOW RISK PATIENT: ICD-10-CM

## 2019-04-09 DIAGNOSIS — Z12.5 SCREENING FOR PROSTATE CANCER: ICD-10-CM

## 2019-04-09 DIAGNOSIS — Z87.891 PERSONAL HISTORY OF SMOKING: ICD-10-CM

## 2019-04-09 DIAGNOSIS — R17 ELEVATED BILIRUBIN: Primary | ICD-10-CM

## 2019-04-09 DIAGNOSIS — M62.08 DIASTASIS RECTI: ICD-10-CM

## 2019-04-09 DIAGNOSIS — Z13.6 ENCOUNTER FOR ABDOMINAL AORTIC ANEURYSM (AAA) SCREENING: ICD-10-CM

## 2019-04-09 DIAGNOSIS — Z00.00 MEDICARE WELCOME VISIT: ICD-10-CM

## 2019-04-09 DIAGNOSIS — Z11.59 NEED FOR HEPATITIS C SCREENING TEST: ICD-10-CM

## 2019-04-09 PROCEDURE — 99397 PER PM REEVAL EST PAT 65+ YR: CPT | Performed by: FAMILY MEDICINE

## 2019-04-09 RX ORDER — SIMVASTATIN 10 MG
10 TABLET ORAL AT BEDTIME
Qty: 90 TABLET | Refills: 3 | Status: SHIPPED | OUTPATIENT
Start: 2019-04-09 | End: 2019-04-24

## 2019-04-09 ASSESSMENT — ENCOUNTER SYMPTOMS
NERVOUS/ANXIOUS: 0
HEADACHES: 0
CONSTIPATION: 0
FREQUENCY: 0
PARESTHESIAS: 0
SHORTNESS OF BREATH: 0
WEAKNESS: 0
HEMATURIA: 0
SORE THROAT: 1
DIZZINESS: 0
ABDOMINAL PAIN: 0
COUGH: 1
FEVER: 0
HEMATOCHEZIA: 0
CHILLS: 0
PALPITATIONS: 0
DIARRHEA: 0
DYSURIA: 0
NAUSEA: 0
MYALGIAS: 0
HEARTBURN: 0
JOINT SWELLING: 0
EYE PAIN: 0
ARTHRALGIAS: 0

## 2019-04-09 ASSESSMENT — ACTIVITIES OF DAILY LIVING (ADL): CURRENT_FUNCTION: NO ASSISTANCE NEEDED

## 2019-04-09 ASSESSMENT — MIFFLIN-ST. JEOR: SCORE: 1682.57

## 2019-04-09 NOTE — PROGRESS NOTES
"SUBJECTIVE:   Chinedu Houser is a 63 year old male who presents for Preventive Visit.      Are you in the first 12 months of your Medicare coverage?  Yes,  Visual Acuity:  Right Eye: 10/10   Left Eye: 10/12.5  Both Eyes: 10/10    Healthy Habits:     In general, how would you rate your overall health?  Good    Frequency of exercise:  4-5 days/week    Duration of exercise:  30-45 minutes    Do you usually eat at least 4 servings of fruit and vegetables a day, include whole grains    & fiber and avoid regularly eating high fat or \"junk\" foods?  Yes    Taking medications regularly:  Yes    Ability to successfully perform activities of daily living:  No assistance needed    Home Safety:  No safety concerns identified    Hearing Impairment:  No hearing concerns    In the past 6 months, have you been bothered by leaking of urine?  No    In general, how would you rate your overall mental or emotional health?  Excellent      PHQ-2 Total Score: 0    Additional concerns today:  No    Do you feel safe in your environment? Yes    Do you have a Health Care Directive? Yes: Patient states has Advance Directive and will bring in a copy to clinic.      Fall risk  Fallen 2 or more times in the past year?: No  Any fall with injury in the past year?: No    Cognitive Screening   1) Repeat 3 items (Leader, Season, Table)    2) Clock draw: NORMAL  3) 3 item recall: Recalls 3 objects  Results: 3 items recalled: COGNITIVE IMPAIRMENT LESS LIKELY    Mini-CogTM Copyright TARIQ Akers. Licensed by the author for use in [facility]; reprinted with permission ([email or web address]). All rights reserved.      Do you have sleep apnea, excessive snoring or daytime drowsiness?: sleep apnea    Reviewed and updated as needed this visit by clinical staff         Reviewed and updated as needed this visit by Provider        Social History     Tobacco Use     Smoking status: Former Smoker     Last attempt to quit: 1985     Years since quittin.2 "     Smokeless tobacco: Never Used   Substance Use Topics     Alcohol use: No     Alcohol/week: 0.0 oz         Alcohol Use 4/9/2019   Prescreen: >3 drinks/day or >7 drinks/week? Not Applicable               Current providers sharing in care for this patient include:   Patient Care Team:  Rickey Granda MD as PCP - General (Family Practice)  Rickey Granda MD as Assigned PCP    The following health maintenance items are reviewed in Epic and correct as of today:  Health Maintenance   Topic Date Due     HIV SCREEN (SYSTEM ASSIGNED)  06/28/1971     HEPATITIS C SCREENING  06/28/1971     ZOSTER IMMUNIZATION (1 of 2) 06/28/2003     ADVANCE DIRECTIVE PLANNING Q5 YRS  06/28/2008     DTAP/TDAP/TD IMMUNIZATION (1 - Tdap) 12/03/2013     FALL RISK ASSESSMENT  06/28/2018     PNEUMOCOCCAL IMMUNIZATION 65+ LOW/MEDIUM RISK (1 of 2 - PCV13) 06/28/2018     AORTIC ANEURYSM SCREENING (SYSTEM ASSIGNED)  06/28/2018     PHQ-2 Q1 YR  03/16/2019     MEDICARE ANNUAL WELLNESS VISIT  03/16/2019     LIPID SCREEN Q5 YR MALE (SYSTEM ASSIGNED)  03/16/2023     COLON CANCER SCREEN (SYSTEM ASSIGNED)  12/11/2023     INFLUENZA VACCINE  Completed     IPV IMMUNIZATION  Aged Out     MENINGITIS IMMUNIZATION  Aged Out           Review of Systems   Constitutional: Negative for chills and fever.   HENT: Positive for sore throat. Negative for congestion, ear pain and hearing loss.    Eyes: Negative for pain and visual disturbance.   Respiratory: Positive for cough. Negative for shortness of breath.    Cardiovascular: Negative for chest pain, palpitations and peripheral edema.   Gastrointestinal: Negative for abdominal pain, constipation, diarrhea, heartburn, hematochezia and nausea.   Genitourinary: Negative for discharge, dysuria, frequency, genital sores, hematuria, impotence and urgency.   Musculoskeletal: Negative for arthralgias, joint swelling and myalgias.   Skin: Negative for rash.   Neurological: Negative for dizziness, weakness, headaches and  "paresthesias.   Psychiatric/Behavioral: Negative for mood changes. The patient is not nervous/anxious.          OBJECTIVE:   There were no vitals taken for this visit. Estimated body mass index is 28.38 kg/m  as calculated from the following:    Height as of 3/16/18: 1.753 m (5' 9\").    Weight as of 3/16/18: 87.2 kg (192 lb 3.2 oz).  Physical Exam      Diagnostic Test Results:  none     ASSESSMENT / PLAN:       ICD-10-CM    1. Elevated bilirubin R17 **Comprehensive metabolic panel FUTURE anytime     Haptoglobin     **CBC with platelets FUTURE anytime     Lactate Dehydrogenase     CANCELED: Comprehensive metabolic panel     CANCELED: Haptoglobin     CANCELED: CBC with platelets     CANCELED: Lactate Dehydrogenase   2. Medicare welcome visit Z00.00    3. Need for hepatitis C screening test Z11.59    4. Screening for HIV (human immunodeficiency virus) Z11.4    5. Diastasis recti M62.08    6. Hypercholesterolemia E78.00 simvastatin (ZOCOR) 10 MG tablet     Lipid panel reflex to direct LDL Fasting     **Comprehensive metabolic panel FUTURE anytime     CANCELED: Lipid panel reflex to direct LDL Fasting     CANCELED: Comprehensive metabolic panel   7. Encounter for abdominal aortic aneurysm (AAA) screening Z13.6 US Aorta Medicare AAA Screening   8. Screening for prostate cancer Z12.5 **Prostate spec antigen screen FUTURE anytime     CANCELED: Prostate spec antigen screen   9. Encounter for hepatitis C screening test for low risk patient Z11.59 **Hepatitis C Screen Reflex to RNA FUTURE anytime     CANCELED: Hepatitis C Screen Reflex to HCV RNA Quant and Genotype   10. Personal history of smoking Z87.891 US Aorta Medicare AAA Screening       End of Life Planning:  Patient currently has an advanced directive: No.  I have verified the patient's ablity to prepare an advanced directive/make health care decisions.  Literature was provided to assist patient in preparing an advanced directive.    COUNSELING:  Reviewed preventive " "health counseling, as reflected in patient instructions       Consider AAA screening for ages 65-75 and smoking history       Regular exercise       Healthy diet/nutrition       Vision screening       Dental care       Immunizations    Vaccinated for: Pneumococcal and Zoster  At the pharmacy            Aspirin Prophylaxsis       Hepatitis C screening       Prostate cancer screening       Osteoporosis Prevention/Bone Health    BP Readings from Last 1 Encounters:   03/16/18 126/64     Estimated body mass index is 28.38 kg/m  as calculated from the following:    Height as of 3/16/18: 1.753 m (5' 9\").    Weight as of 3/16/18: 87.2 kg (192 lb 3.2 oz).    BP Screening:   Last 3 BP Readings:    BP Readings from Last 3 Encounters:   04/09/19 144/76   03/16/18 126/64   09/21/17 122/68       The following was recommended to the patient:  Re-screen within 4 weeks and recommend lifestyle modifications       reports that he quit smoking about 34 years ago. He has never used smokeless tobacco.      Appropriate preventive services were discussed with this patient, including applicable screening as appropriate for cardiovascular disease, diabetes, osteopenia/osteoporosis, and glaucoma.  As appropriate for age/gender, discussed screening for colorectal cancer, prostate cancer, breast cancer, and cervical cancer. Checklist reviewing preventive services available has been given to the patient.    Reviewed patients plan of care and provided an AVS. The Basic Care Plan (routine screening as documented in Health Maintenance) for Chinedu meets the Care Plan requirement. This Care Plan has been established and reviewed with the Patient.    Counseling Resources:  ATP IV Guidelines  Pooled Cohorts Equation Calculator  Breast Cancer Risk Calculator  FRAX Risk Assessment  ICSI Preventive Guidelines  Dietary Guidelines for Americans, 2010  USDA's MyPlate  ASA Prophylaxis  Lung CA Screening    Charles Skinner MD  [facility] " [place]    Identified Health Risks:  --------------------------------------------------------------------------------------------------------------------------------------  SUBJECTIVE:  Chinedu Houser is a 63 year old male who presents to the clinic today for a routine physical exam.    The patient's last physical was 1 years ago.     Cholesterol   Date Value Ref Range Status   03/16/2018 140 <200 mg/dL Final     HDL Cholesterol   Date Value Ref Range Status   03/16/2018 45 23 - 92 mg/dL Final   03/14/2017 43 23 - 92 mg/dL      LDL Cholesterol Calculated   Date Value Ref Range Status   03/16/2018 83 <100 mg/dL Final     Comment:     Desirable:       <100 mg/dl   03/14/2017 95 <100 mg/dL      Triglycerides   Date Value Ref Range Status   03/16/2018 60 <150 mg/dL Final   03/14/2017 82 <150 mg/dL      No results found for: CHOLHDLRATIO  The patient's last fasting lipid panel was done 1 years ago and the results are listed above.        The 10-year ASCVD risk score (Anish VIEIRA Jr., et al., 2013) is: 16.4%    Values used to calculate the score:      Age: 63 years      Sex: Male      Is Non- : No      Diabetic: No      Tobacco smoker: No      Systolic Blood Pressure: 167 mmHg      Is BP treated: No      HDL Cholesterol: 45 mg/dL      Total Cholesterol: 140 mg/dL        The patient reports that he has never been treated for high blood pressure.    The patient reports that he does take a daily aspirin.    No results found for: HCVAB  The patient reports that he has not been screened for Hepatitis C    (Screen all baby boomers once per CDC-- the generation born from 1945 through 1965)  He would like to have an Hepatitis C test today          Immunization History   Administered Date(s) Administered     DTaP, Unspecified 12/02/2013     Flu, Unspecified 01/01/2008, 09/30/2009     Influenza (High Dose) 3 valent vaccine 10/16/2018     Influenza Vaccine IM 3yrs+ 4 Valent IIV4 08/01/2016     TDAP Vaccine  (Boostrix) 12/02/2013     The patient's believes that his last tetanus shot was given 6 year(s) ago.   The patient believes that he has not had a Shingrix in the past  The patient believes that he has not had a PPSV23 in the past.  The patient believes that he has not had a PCV13 in the past.  The patient believes that he has had a seasonal flu vaccination this fall or winter.  The patient would like to have a PCV13 and Shingrix      No results found for this or any previous visit.]   The patient denies a family history of colon cancer.  The patient reports that he has had a colonoscopy. His  last colonoscopy was in 2013 and he  report that is was normal. The patient was told to have this repeated in 10 years.      His currently used contraception is a vasectomy.  The patient reports that he and his wife or partner are not using contraception as she has gone through menopause.    The patient denies a family history of diabetes.  The patient denies a family history of prostate cancer. After discussing the pros and cons of checking a PSA he  Does want to have this test drawn today.   The patient reports that he eats or drinks 1-2 servings of dairy products per day. He does not take a calcium supplement at all.  The patient reports that he has dental appointments approximately every 6 months.  The patient reports that he  has an eye examination approximately every 2 year(s).    Do you currently smoke? No, quit in 1985  How many years have you smoked? 10   How many packs per day did you smoke on average? 1 ppd  (if more than 30 pack year history and the patient is age 55-80 consider ordering an annual low dose radiation lung CT to screen for cancer)  (Do not order if patient has quit more than 15 years ago or has a health condition that limits life expectancy or could not tolerate curative lung surgery)  Are you interested having a lung CT to screen for lung cancer? N/A    If the patient has smoked more that 100  cigarettes, has the patient had an imaging study (US or CT) for an AAA between the ages of 65 and 75? No: .              Patient Active Problem List   Diagnosis     Apnea spell     Arthrodesis status     Personal history of other diseases of digestive system     Contact dermatitis and eczema     Elevated PSA     Jaundice, non-     Hypercholesterolemia     Physical exam     Disorder of bursae and tendons in shoulder region     Screening for diabetes mellitus     Routine general medical examination at a health care facility       Past Surgical History:   Procedure Laterality Date     ARTHROSCOPY KNEE      ,both knees     COLONOSCOPY           COLONOSCOPY      2013,Normal - follow up 10 years     TONSILLECTOMY, ADENOIDECTOMY, COMBINED      T & A       Family History   Problem Relation Age of Onset     Heart Disease Father 62        Heart Disease,MI     Family History Negative Mother         Good Health     Heart Disease Brother 50        Heart Disease,Heart disease with stenting     Heart Disease Brother 50        Heart Disease,Heart disease with stenting       Social History     Socioeconomic History     Marital status:      Spouse name: Not on file     Number of children: Not on file     Years of education: Not on file     Highest education level: Not on file   Occupational History     Occupation: OWNER   Social Needs     Financial resource strain: Not on file     Food insecurity:     Worry: Not on file     Inability: Not on file     Transportation needs:     Medical: Not on file     Non-medical: Not on file   Tobacco Use     Smoking status: Former Smoker     Last attempt to quit: 1985     Years since quittin.2     Smokeless tobacco: Never Used   Substance and Sexual Activity     Alcohol use: No     Alcohol/week: 0.0 oz     Drug use: No     Types: Other     Comment: Drug use: No     Sexual activity: Not on file   Lifestyle     Physical activity:     Days per week: Not on file      "Minutes per session: Not on file     Stress: Not on file   Relationships     Social connections:     Talks on phone: Not on file     Gets together: Not on file     Attends Restorationist service: Not on file     Active member of club or organization: Not on file     Attends meetings of clubs or organizations: Not on file     Relationship status: Not on file     Intimate partner violence:     Fear of current or ex partner: Not on file     Emotionally abused: Not on file     Physically abused: Not on file     Forced sexual activity: Not on file   Other Topics Concern     Parent/sibling w/ CABG, MI or angioplasty before 65F 55M? Not Asked   Social History Narrative     with 4 children.  Works as a jeweler.    Patient is a former smoker.  quite at 29 yrs of age    Alcohol Use - no    Preload  7/9/2013       Current Outpatient Medications   Medication Sig Dispense Refill     aspirin EC 81 MG EC tablet Take 81 mg by mouth daily with food       fish oil-omega-3 fatty acids 1000 MG capsule        Glucosamine Sulfate 500 MG TABS Patient unsure of dose.       simvastatin (ZOCOR) 10 MG tablet TAKE 1 TABLET BY MOUTH AT BEDTIME. 90 tablet 0         PHYSICAL EXAMINATION:  Blood pressure 167/76, pulse 50, temperature 98.3  F (36.8  C), temperature source Oral, resp. rate 20, height 1.753 m (5' 9\"), weight 90.7 kg (200 lb), SpO2 98 %.  General appearance - healthy, alert and no distress  Skin - Skin color, texture, turgor normal. No rashes or lesions.  Head - Normocephalic. No masses, lesions, tenderness or abnormalities  Eyes - conjunctivae/corneas clear. PERRL, EOM's intact. Fundi benign  Ears - External ears normal. Canals clear. TM's normal.  Nose/Sinuses - Nares normal. Septum midline. Mucosa normal. No drainage or sinus tenderness.  Oropharynx - Lips, mucosa, and tongue normal. Teeth and gums normal.  Neck - Neck supple. No adenopathy. Thyroid symmetric, normal size,  Lungs - Percussion normal. Good diaphragmatic excursion. " Lungs clear  Heart - PMI normal. No lifts, heaves, or thrills. RRR. No murmurs, clicks gallops or rub  Abdomen - Abdomen soft, non-tender. BS normal. No masses, organomegaly distasis recti with abdominal muslce c contraction   Extremities - Extremities normal. No deformities, edema, or skin discoloration.  Musculoskeletal - Spine ROM normal. Muscular strength intact.  Peripheral pulses - radial=4/4, femoral=4/4, popliteal=4/4, dorsalis pedis=4/4,  Neuro - Gait normal. Reflexes normal and symmetric. Sensation grossly WNL.  Genitalia - Penis normal. No urethral discharge. Scrotum normal to palpation. No hernia.  Rectal - Rectal negative. Prostate palpation negative.  No rectal masses or abnormalities      Office Visit on 03/16/2018   Component Date Value Ref Range Status     Sodium 03/16/2018 138  134 - 144 mmol/L Final     Potassium 03/16/2018 4.3  3.5 - 5.1 mmol/L Final     Chloride 03/16/2018 103  98 - 107 mmol/L Final     Carbon Dioxide 03/16/2018 30  21 - 31 mmol/L Final     Anion Gap 03/16/2018 5  3 - 14 mmol/L Final     Glucose 03/16/2018 101  70 - 105 mg/dL Final     Urea Nitrogen 03/16/2018 11  7 - 25 mg/dL Final     Creatinine 03/16/2018 0.76  0.70 - 1.30 mg/dL Final     GFR Estimate 03/16/2018 >90  >60 mL/min/1.7m2 Final     GFR Estimate If Black 03/16/2018 >90  >60 mL/min/1.7m2 Final     Calcium 03/16/2018 9.8  8.6 - 10.3 mg/dL Final     Bilirubin Total 03/16/2018 1.6* 0.3 - 1.0 mg/dL Final     Albumin 03/16/2018 4.6  3.5 - 5.7 g/dL Final     Protein Total 03/16/2018 7.4  6.4 - 8.9 g/dL Final     Alkaline Phosphatase 03/16/2018 49  34 - 104 U/L Final     ALT 03/16/2018 20  7 - 52 U/L Final     AST 03/16/2018 23  13 - 39 U/L Final     Cholesterol 03/16/2018 140  <200 mg/dL Final     Triglycerides 03/16/2018 60  <150 mg/dL Final     HDL Cholesterol 03/16/2018 45  23 - 92 mg/dL Final     LDL Cholesterol Calculated 03/16/2018 83  <100 mg/dL Final    Desirable:       <100 mg/dl     Non HDL Cholesterol  03/16/2018 95  <130 mg/dL Final     Prostate Specific Antigen 03/16/2018 1.222  <3.100 ng/mL Final       ASSESSMENT:    ICD-10-CM    1. Medicare welcome visit Z00.00    2. Need for hepatitis C screening test Z11.59    3. Screening for HIV (human immunodeficiency virus) Z11.4        Well-Adult Physical Exam.  Health Maintenance Due   Topic Date Due     HIV SCREEN (SYSTEM ASSIGNED)  06/28/1971     HEPATITIS C SCREENING  06/28/1971     ZOSTER IMMUNIZATION (1 of 2) 06/28/2003     ADVANCE DIRECTIVE PLANNING Q5 YRS  06/28/2008     DTAP/TDAP/TD IMMUNIZATION (1 - Tdap) 12/03/2013     FALL RISK ASSESSMENT  06/28/2018     PNEUMOCOCCAL IMMUNIZATION 65+ LOW/MEDIUM RISK (1 of 2 - PCV13) 06/28/2018     AORTIC ANEURYSM SCREENING (SYSTEM ASSIGNED)  06/28/2018     PHQ-2 Q1 YR  03/16/2019     MEDICARE ANNUAL WELLNESS VISIT  03/16/2019     Health Maintenance   Topic Date Due     HIV SCREEN (SYSTEM ASSIGNED)  06/28/1971     HEPATITIS C SCREENING  06/28/1971     ZOSTER IMMUNIZATION (1 of 2) 06/28/2003     ADVANCE DIRECTIVE PLANNING Q5 YRS  06/28/2008     DTAP/TDAP/TD IMMUNIZATION (1 - Tdap) 12/03/2013     FALL RISK ASSESSMENT  06/28/2018     PNEUMOCOCCAL IMMUNIZATION 65+ LOW/MEDIUM RISK (1 of 2 - PCV13) 06/28/2018     AORTIC ANEURYSM SCREENING (SYSTEM ASSIGNED)  06/28/2018     PHQ-2 Q1 YR  03/16/2019     MEDICARE ANNUAL WELLNESS VISIT  03/16/2019     LIPID SCREEN Q5 YR MALE (SYSTEM ASSIGNED)  03/16/2023     COLON CANCER SCREEN (SYSTEM ASSIGNED)  12/11/2023     INFLUENZA VACCINE  Completed     IPV IMMUNIZATION  Aged Out     MENINGITIS IMMUNIZATION  Aged Out         HEALTH CARE MAINTENENCE: The recommended screening tests and vaccinatons for this patient have been discussed as above.  The appropriate tests and vaccinations  have been ordered or declined by the patient. Please see the orders in EPIC.The patient specifically declines: n/a     Immunization Status:  up to date and documented except for Shingrix and PCV13    Patient Active  Problem List   Diagnosis     Apnea spell     Arthrodesis status     Personal history of other diseases of digestive system     Contact dermatitis and eczema     Elevated PSA     Jaundice, non-     Hypercholesterolemia     Physical exam     Disorder of bursae and tendons in shoulder region     Screening for diabetes mellitus     Routine general medical examination at a health care facility        ATP III Guidelines  ICSI Preventive Guidelines    PLAN:   DASH information given to patient recheck blood pressure in 2 month(s) in clinic     The patient will make a future lab appointment for all bloodwork as they are not fasting today  Check a fasting lipid profile  I recommended continuing to take a daily aspirin (81 to 325 mg)  Hepatitis C antibody ordered  Shingrix recommended  PCV13 recommended  Check a fasting glucose  Check a PSA  Discussed calcium intake, vitamins and supplements. Recommended 1000 mg of calcium daily  Weight loss through diet and exercise was recommended  Sunscreen use was recommended especially in the area of tatoos  Refills on chronic medication given  Recommended dental exams every 6 months  Recommended eye exam every 1-2 years  Follow up in 1 year for the next preventative medical visit        Body mass index is 29.53 kg/m .

## 2019-04-09 NOTE — NURSING NOTE
"Chief Complaint   Patient presents with     Wellness Visit     Health Maintenance     order pended, fall risk, adv dir, PHQ-2       Initial /76   Pulse 50   Temp 98.3  F (36.8  C) (Oral)   Resp 20   Ht 1.753 m (5' 9\")   Wt 90.7 kg (200 lb)   SpO2 98%   BMI 29.53 kg/m   Estimated body mass index is 29.53 kg/m  as calculated from the following:    Height as of this encounter: 1.753 m (5' 9\").    Weight as of this encounter: 90.7 kg (200 lb).  Medication Reconciliation: complete  Leanne Stuart, MARIELLA  "

## 2019-04-23 DIAGNOSIS — Z12.5 SCREENING FOR PROSTATE CANCER: ICD-10-CM

## 2019-04-23 DIAGNOSIS — Z11.59 ENCOUNTER FOR HEPATITIS C SCREENING TEST FOR LOW RISK PATIENT: ICD-10-CM

## 2019-04-23 DIAGNOSIS — E78.00 HYPERCHOLESTEROLEMIA: ICD-10-CM

## 2019-04-23 DIAGNOSIS — R17 ELEVATED BILIRUBIN: ICD-10-CM

## 2019-04-23 LAB
ALBUMIN SERPL-MCNC: 4.1 G/DL (ref 3.4–5)
ALP SERPL-CCNC: 69 U/L (ref 40–150)
ALT SERPL W P-5'-P-CCNC: 24 U/L (ref 0–70)
ANION GAP SERPL CALCULATED.3IONS-SCNC: 5 MMOL/L (ref 3–14)
AST SERPL W P-5'-P-CCNC: 20 U/L (ref 0–45)
BILIRUB SERPL-MCNC: 0.7 MG/DL (ref 0.2–1.3)
BUN SERPL-MCNC: 14 MG/DL (ref 7–30)
CALCIUM SERPL-MCNC: 9.4 MG/DL (ref 8.5–10.1)
CHLORIDE SERPL-SCNC: 109 MMOL/L (ref 94–109)
CHOLEST SERPL-MCNC: 159 MG/DL
CO2 SERPL-SCNC: 28 MMOL/L (ref 20–32)
CREAT SERPL-MCNC: 0.83 MG/DL (ref 0.66–1.25)
ERYTHROCYTE [DISTWIDTH] IN BLOOD BY AUTOMATED COUNT: 13.3 % (ref 10–15)
GFR SERPL CREATININE-BSD FRML MDRD: >90 ML/MIN/{1.73_M2}
GLUCOSE SERPL-MCNC: 99 MG/DL (ref 70–99)
HCT VFR BLD AUTO: 47.2 % (ref 40–53)
HCV AB SERPL QL IA: NONREACTIVE
HDLC SERPL-MCNC: 45 MG/DL
HGB BLD-MCNC: 15.6 G/DL (ref 13.3–17.7)
LDH SERPL L TO P-CCNC: 158 U/L (ref 85–227)
LDLC SERPL CALC-MCNC: 97 MG/DL
MCH RBC QN AUTO: 30.5 PG (ref 26.5–33)
MCHC RBC AUTO-ENTMCNC: 33.1 G/DL (ref 31.5–36.5)
MCV RBC AUTO: 92 FL (ref 78–100)
NONHDLC SERPL-MCNC: 114 MG/DL
PLATELET # BLD AUTO: 263 10E9/L (ref 150–450)
POTASSIUM SERPL-SCNC: 4.4 MMOL/L (ref 3.4–5.3)
PROT SERPL-MCNC: 7.9 G/DL (ref 6.8–8.8)
PSA SERPL-ACNC: 0.89 UG/L (ref 0–4)
RBC # BLD AUTO: 5.12 10E12/L (ref 4.4–5.9)
SODIUM SERPL-SCNC: 142 MMOL/L (ref 133–144)
TRIGL SERPL-MCNC: 83 MG/DL
WBC # BLD AUTO: 8.2 10E9/L (ref 4–11)

## 2019-04-23 PROCEDURE — 83010 ASSAY OF HAPTOGLOBIN QUANT: CPT | Performed by: FAMILY MEDICINE

## 2019-04-23 PROCEDURE — 85027 COMPLETE CBC AUTOMATED: CPT | Performed by: FAMILY MEDICINE

## 2019-04-23 PROCEDURE — 36415 COLL VENOUS BLD VENIPUNCTURE: CPT | Performed by: FAMILY MEDICINE

## 2019-04-23 PROCEDURE — 80061 LIPID PANEL: CPT | Performed by: FAMILY MEDICINE

## 2019-04-23 PROCEDURE — 86803 HEPATITIS C AB TEST: CPT | Performed by: FAMILY MEDICINE

## 2019-04-23 PROCEDURE — 83615 LACTATE (LD) (LDH) ENZYME: CPT | Performed by: FAMILY MEDICINE

## 2019-04-23 PROCEDURE — G0103 PSA SCREENING: HCPCS | Performed by: FAMILY MEDICINE

## 2019-04-23 PROCEDURE — 80053 COMPREHEN METABOLIC PANEL: CPT | Performed by: FAMILY MEDICINE

## 2019-04-23 NOTE — RESULT ENCOUNTER NOTE
The 10-year ASCVD risk score (Anish VIEIRA Jr., et al., 2013) is: 14%    Values used to calculate the score:      Age: 63 years      Sex: Male      Is Non- : No      Diabetic: No      Tobacco smoker: No      Systolic Blood Pressure: 144 mmHg      Is BP treated: No      HDL Cholesterol: 45 mg/dL      Total Cholesterol: 159 mg/dL

## 2019-04-23 NOTE — LETTER
April 24, 2019    Chinedu Houser  [address] [address] [facility] [address]    [facility] [address]            Dear Chinedu,    The 10-year ASCVD risk score (Anish ISHA Jr., et al., 2013) is: 14%     Values used to calculate the score:       Age: 63 years       Sex: Male       Is Non- : No       Diabetic: No       Tobacco smoker: No       Systolic Blood Pressure: 144 mmHg       Is BP treated: No       HDL Cholesterol: 45 mg/dL       Total Cholesterol: 159 mg/dL     Below is a copy of the results.  It was a pleasure to see you at your last appointment.    If you have any questions or concerns, please call myself or my nurse at [phone].    Sincerely,    Charles Skinner MD / alek    Results for orders placed or performed in visit on 04/23/19   Lactate Dehydrogenase   Result Value Ref Range    Lactate Dehydrogenase 158 85 - 227 U/L   **CBC with platelets FUTURE anytime   Result Value Ref Range    WBC 8.2 4.0 - 11.0 10e9/L    RBC Count 5.12 4.4 - 5.9 10e12/L    Hemoglobin 15.6 13.3 - 17.7 g/dL    Hematocrit 47.2 40.0 - 53.0 %    MCV 92 78 - 100 fl    MCH 30.5 26.5 - 33.0 pg    MCHC 33.1 31.5 - 36.5 g/dL    RDW 13.3 10.0 - 15.0 %    Platelet Count 263 150 - 450 10e9/L   **Prostate spec antigen screen FUTURE anytime   Result Value Ref Range    PSA 0.89 0 - 4 ug/L   **Comprehensive metabolic panel FUTURE anytime   Result Value Ref Range    Sodium 142 133 - 144 mmol/L    Potassium 4.4 3.4 - 5.3 mmol/L    Chloride 109 94 - 109 mmol/L    Carbon Dioxide 28 20 - 32 mmol/L    Anion Gap 5 3 - 14 mmol/L    Glucose 99 70 - 99 mg/dL    Urea Nitrogen 14 7 - 30 mg/dL    Creatinine 0.83 0.66 - 1.25 mg/dL    GFR Estimate >90 >60 mL/min/[1.73_m2]    GFR Estimate If Black >90 >60 mL/min/[1.73_m2]    Calcium 9.4 8.5 - 10.1 mg/dL    Bilirubin Total 0.7 0.2 - 1.3 mg/dL    Albumin 4.1 3.4 - 5.0 g/dL    Protein Total 7.9 6.8 - 8.8 g/dL    Alkaline Phosphatase 69 40 - 150 U/L    ALT 24 0 - 70 U/L    AST 20 0 - 45 U/L   Lipid panel  reflex to direct LDL Fasting   Result Value Ref Range    Cholesterol 159 <200 mg/dL    Triglycerides 83 <150 mg/dL    HDL Cholesterol 45 >39 mg/dL    LDL Cholesterol Calculated 97 <100 mg/dL    Non HDL Cholesterol 114 <130 mg/dL   **Hepatitis C Screen Reflex to RNA FUTURE anytime   Result Value Ref Range    Hepatitis C Antibody Nonreactive NR^Nonreactive

## 2019-04-24 DIAGNOSIS — E78.00 HYPERCHOLESTEROLEMIA: ICD-10-CM

## 2019-04-24 LAB — HAPTOGLOB SERPL-MCNC: 133 MG/DL (ref 35–175)

## 2019-04-24 RX ORDER — SIMVASTATIN 20 MG
20 TABLET ORAL AT BEDTIME
Qty: 90 TABLET | Refills: 3 | Status: SHIPPED | OUTPATIENT
Start: 2019-04-24 | End: 2020-05-13

## 2019-05-07 ENCOUNTER — ANCILLARY PROCEDURE (OUTPATIENT)
Dept: ULTRASOUND IMAGING | Facility: CLINIC | Age: 64
End: 2019-05-07
Attending: FAMILY MEDICINE
Payer: COMMERCIAL

## 2019-05-07 DIAGNOSIS — Z13.6 ENCOUNTER FOR ABDOMINAL AORTIC ANEURYSM (AAA) SCREENING: ICD-10-CM

## 2019-05-07 DIAGNOSIS — Z87.891 PERSONAL HISTORY OF SMOKING: ICD-10-CM

## 2019-05-07 PROCEDURE — 76706 US ABDL AORTA SCREEN AAA: CPT

## 2019-05-07 NOTE — LETTER
May 8, 2019    Chinedu Houser  [address]    [facility] [address]            SHEILA Che have reviewed the report of the imaging test or tests that we recently ordered. The results were normal or considered normal for you.    If you have any questions or concerns, please call myself or my nurse at [phone].    Sincerely,    Charles Skinner MD/radha    Results for orders placed or performed in visit on 05/07/19   US Aorta Medicare AAA Screening    Narrative    ULTRASOUND  OF THE ABDOMINAL AORTA  5/7/2019 7:09 AM     HISTORY: Encounter for abdominal aortic aneurysm (AAA) screening;  Personal history of smoking    COMPARISON: None.    FINDINGS:  The abdominal aorta is normal in caliber with no aneurysm.   The maximum diameter of the abdominal aorta is 2.4 cm. The common  iliac arteries are normal in caliber with no aneurysm.         Impression    IMPRESSION:  No evidence for an abdominal aortic aneurysm.      NESHA MILLER MD

## 2019-05-07 NOTE — RESULT ENCOUNTER NOTE
Chinedu,  I have reviewed the report of the imaging test or tests that we recently ordered. The results were normal or considered normal for you.  Sincerely,   Charles Skinner

## 2019-06-12 ENCOUNTER — ALLIED HEALTH/NURSE VISIT (OUTPATIENT)
Dept: NURSING | Facility: CLINIC | Age: 64
End: 2019-06-12
Payer: COMMERCIAL

## 2019-06-12 VITALS — DIASTOLIC BLOOD PRESSURE: 73 MMHG | SYSTOLIC BLOOD PRESSURE: 139 MMHG | HEART RATE: 59 BPM | OXYGEN SATURATION: 99 %

## 2019-06-12 DIAGNOSIS — Z01.30 BLOOD PRESSURE CHECK: Primary | ICD-10-CM

## 2019-06-12 PROCEDURE — 99207 ZZC NO CHARGE NURSE ONLY: CPT

## 2019-06-12 NOTE — PROGRESS NOTES
Chinedu Houser is a 63 year old patient who comes in today for a Blood Pressure check.  Initial BP:  /73   Pulse 59   SpO2 99%      59  Disposition: results routed to provider.    BP Readings from Last 3 Encounters:   06/12/19 139/73   04/09/19 144/76   03/16/18 126/64       Geri Durant MA

## 2020-05-13 DIAGNOSIS — R97.20 ELEVATED PSA: ICD-10-CM

## 2020-05-13 DIAGNOSIS — Z13.1 SCREENING FOR DIABETES MELLITUS: ICD-10-CM

## 2020-05-13 DIAGNOSIS — E78.00 HYPERCHOLESTEROLEMIA: ICD-10-CM

## 2020-05-13 DIAGNOSIS — E78.5 HYPERLIPIDEMIA LDL GOAL <160: ICD-10-CM

## 2020-05-13 DIAGNOSIS — Z00.00 ROUTINE GENERAL MEDICAL EXAMINATION AT A HEALTH CARE FACILITY: Primary | ICD-10-CM

## 2020-05-13 RX ORDER — SIMVASTATIN 20 MG
20 TABLET ORAL AT BEDTIME
Qty: 90 TABLET | Refills: 0 | Status: SHIPPED | OUTPATIENT
Start: 2020-05-13 | End: 2020-07-07

## 2020-05-13 RX ORDER — SIMVASTATIN 20 MG
20 TABLET ORAL AT BEDTIME
Qty: 90 TABLET | Refills: 3 | OUTPATIENT
Start: 2020-05-13

## 2020-05-13 NOTE — TELEPHONE ENCOUNTER
RANJIT and fasting labs scheduled for July. Please refill medication if appropriate. Namita Marie TC/Pt Rep

## 2020-05-13 NOTE — TELEPHONE ENCOUNTER
Health Maintenance Due   Topic Date Due     ADVANCE CARE PLANNING  1955     PHQ-2  01/01/2020     MEDICARE ANNUAL WELLNESS VISIT  04/09/2020     FALL RISK ASSESSMENT  04/09/2020     PNEUMOCOCCAL IMMUNIZATION 65+ LOW/MEDIUM RISK (2 of 2 - PPSV23) 04/23/2020     Lab Results   Component Value Date    CHOL 159 04/23/2019     Lab Results   Component Value Date    HDL 45 04/23/2019     Lab Results   Component Value Date    LDL 97 04/23/2019     Lab Results   Component Value Date    TRIG 83 04/23/2019     No results found for: KARAN  Patient is due for an office visit.  Please advise, would you like patient to do a Virtual Visit for follow up?  If so, which one?   Riana Luis RN

## 2020-05-13 NOTE — TELEPHONE ENCOUNTER
Please call the patient and make an appointment(s) for a complete physical exam in July and then you can refill the medication one time.  Charles Skinner

## 2020-06-26 NOTE — PATIENT INSTRUCTIONS
Patient Education   Personalized Prevention Plan  You are due for the preventive services outlined below.  Your care team is available to assist you in scheduling these services.  If you have already completed any of these items, please share that information with your care team to update in your medical record.  Health Maintenance Due   Topic Date Due     Discuss Advance Care Planning  1955     PHQ-2  01/01/2020     Annual Wellness Visit  04/09/2020     FALL RISK ASSESSMENT  04/09/2020     Pneumococcal Vaccine (2 of 2 - PPSV23) 04/23/2020         You can  Take 800 mg of ibuprofen 3 times a day(s) with food as needed   Patient Education     Lowering Your Blood Pressure with DASH  What is the DASH eating plan?  DASH (Dietary Approaches to Stop Hypertension) can help you prevent or lower high blood pressure. This eating plan provides the nutrients that help lower blood pressure: potassium, magnesium, calcium, protein and fiber.   If not controlled, high blood pressure may lead to heart disease, stroke or blindness.  This eating plan is rich in:     Fruits and vegetables    Whole grains    Fat-free or low-fat milk products    Fish and poultry (chicken, turkey, etc.)    Beans, seeds and nuts.  This eating plan is low in:     Salt and sodium    Sugar, sweets and drinks with sugar    Red meat, saturated fat and trans fat.  Lifestyle changes  Besides a healthy eating plan, other steps are needed to help control high blood pressure.     Stay at a healthy weight.    Be active for at least 30 minutes on most days.    If you drink alcohol, have no more than two drinks per day (for men) or one per day (for women).    If you take blood pressure medicine, take it as directed.  Losing weight with DASH  You can lose weight by eating fewer calories. It is best to take off pounds slowly over time. Talk to a dietitian about the best way to do this.  Staying active   To shed pounds, combine DASH with daily exercise. Start with  a 15-minute walk each day. Slowly increase the time until you reach a total of 30 minutes on most days. To avoid weight gain, try for 60 minutes each day. Check with your doctor before starting any exercise program.  Tips for less sodium    Avoid processed foods. These may include baked goods, cereals, soy sauce and even antacids.    Cook with less salt. Don't bring the saltshaker to the table.    Season food with herbs, spices, lemon, lime, vinegar, wine and salt-free seasoning blends.    Use low-sodium canned vegetables or fruits.  Tips to ease the change  Take a week or two to slowly make changes to your diet.    Add a serving of vegetables at lunch one day. The next day, add a serving at dinner as well.    Add fruit to one meal or eat it as a snack.    Work up to three servings of fat-free and low-fat milk products each day.    If you eat large portions of meat, cut back by a third at each meal. The goal is to eat 6 oz (ounces) of meat or less per day.    Serve brown rice and whole-wheat bread and pasta.    Try casseroles and stir-lebron dishes that have less meat and more vegetables, grains or cooked dry beans.    Serve two or more meatless meals each week.    For snacks and desserts, eat foods low in fat, sodium and sugar, such as:  ? Unsalted rice cakes, nuts or seeds  ? Fresh fruits, raw vegetables and raisins  ? Fat-free, low-fat or frozen yogurt  ? Popcorn with no salt or butter.    If you have trouble digesting milk products, try lactose-free milk or take lactase pills.    If you have a nut allergy, eat seeds, beans, lentils or split peas.    Fruits, vegetables and whole grain foods are high in fiber. To avoid bloating and diarrhea (loose, watery stools), increase these foods over several weeks.  The DASH eating plan  Use this chart to plan your meals. Or take it with you when you shop for food.   Food Group Servings per Day  Serving Size Examples    1,600 Calories 2,000 Calories 2,600 Calories       Grains  (whole wheat) 6 6 to 8 10 to 11   1 slice bread    1 oz dry cereal      cup cooked rice, pasta or cereal Whole-wheat bread and rolls, whole-wheat pasta, English muffin, mani bread, bagel, cereals, grits, oatmeal, brown rice, unsalted pretzels and popcorn   Vegetables  3 to 4 4 to 5 5 to 6   1 cup raw leafy vegetables      cup cut-up raw or cooked vegetable      cup vegetable juice Broccoli, carrots, collards, green beans, green peas, kale, lima beans, potatoes, spinach, squash, sweet potatoes, tomatoes   Fruits 4 4 to 5 5 to 6   1 medium fruit      cup dried fruit      cup fresh, frozen, or canned fruit      cup fruit juice Apples, apricots, bananas, dates, grapes, oranges, grapefruit, mangoes, melons, peaches, pineapples, raisins, strawberries, tangerines   Fat-free or   low-fat milk and milk products 2 to 3 2 to 3 3   1 cup milk or yogurt    1   oz cheese Fat-free or low-fat (1%) milk, buttermilk, cheese, regular or frozen yogurt   Lean meats, poultry and fish 3 to 6 6 or less 6   1 oz cooked meats, poultry (chicken, turkey) or fish    1 egg    2 egg whites Lean meats (trim away any fat, then broil, roast or poach); remove skin from poultry. Eggs are high in cholesterol, so limit egg yolks to four or less per week.   Nuts, seeds   and legumes 3 per week 4 to 5 per week 1 per day   ? cup (1   oz) nuts    2 Tbsp peanut butter    2 Tbsp (   oz) seeds      cup cooked legumes (dry beans and peas) Almonds, hazelnuts, mixed nuts, peanuts, walnuts, sunflower seeds, peanut butter, kidney beans, lentils, split peas   Fats and oils 2 2 to 3 3   1 tsp soft margarine    1 tsp vegetable oil    1 Tbsp rojas    2 Tbsp salad dressing Soft margarine, vegetable oil (such as canola, corn, olive or safflower), low-fat mayonnaise, light salad dressing   Sweets and   added sugars 0 5 or less per week 2 or less per day   1 Tbsp sugar, jelly or jam      cup sorbet or gelatin    1 cup lemonade Fruit-flavored gelatin, fruit punch,  "hard candy, jelly, maple syrup, sorbets and ices   A sample meal plan  This sample menu gives two sodium levels. Start with 2,300 mg of sodium (about 1 teaspoon of table salt per day). Then, try to lower your intake to 1,500 mg a day. Talk to your doctor or dietitian about how to do this.   These samples are for people who eat 2,000 calories per day. The less salt you eat, the more you may lower your blood pressure.   Menu for 2,300 mg sodium per day   Breakfast:   cup instant oatmeal, 1 mini whole-wheat bagel, 1 tablespoon peanut butter, 1 medium banana, 1 cup (8 ounces) low-fat milk.   Lunch: 1 cup cantaloupe chunks, 1 cup apple juice and one chicken breast sandwich that includes:    Two slices (3 ounces) chicken breast, no skin    Two slices whole-wheat bread    1 slice (   ounce) reduced-fat cheddar cheese    One leaf jeffery lettuce    Two slices tomato    1 tablespoon low-fat mayonnaise.  Dinner: 1 cup cooked spaghetti,   cup low-salt vegetarian spaghetti sauce, 3 tablespoons Parmesan cheese;   cup corn (from frozen);   cup canned pears in juice; one spinach salad that includes:    1 cup fresh spinach leaves      cup fresh carrots, grated      cup fresh mushrooms, sliced    1 tablespoon vinegar and oil dressing.  Snacks:? cup unsalted almonds;   cup dried apricots; 1 cup fat-free fruit yogurt, no sugar added.  Reducing to 1,500 mg sodium per day  Use the same menu plan, but:    For breakfast, replace instant oatmeal with regular oatmeal and 1 teaspoon cinnamon.    For lunch, replace cheddar cheese with low-sodium Swiss cheese.  Resources on diet and health  National [employer] [place]  [facility]  [address] [address]   [place] [address]   Phone: [phone]   TTY: [phone]   www.nhlbi.nih.gov   \"Aim for a Healthy Weight\"   www.nhlbi.nih.gov/health/public/heart/obesity/lose_wt/index.htm  \"Dietary Guidelines for Americans 2005\"   and \"A Healthier You\" " "  www.healthierus.gov/dietaryguidelines  For informational purposes only. Not to replace the advice of your health care provider. Adapted from \"Your Guide to Lowering Blood Pressure with DASH,\" by the National Heart, Lung and Blood [place],   NIH Publication No. , revised April 2006. Available at www.nhlbi.nih.gov/health/public/heart/hbp/dash/index.htm. Published by [employer]. [employer] 930429 - REV 09/15.  For informational purposes only. Not to replace the advice of your health care provider.  Copyright   2018 [employer]. All rights reserved.           "

## 2020-06-26 NOTE — PROGRESS NOTES
"  SUBJECTIVE:   Chinedu Houser is a 64 year old male who presents for Preventive Visit.      Are you in the first 12 months of your Medicare Part B coverage?  No    Physical Health:    In general, how would you rate your overall physical health? good    Outside of work, how many days during the week do you exercise? 4-5 days/week    Outside of work, approximately how many minutes a day do you exercise?45-60 minutes    If you drink alcohol do you typically have >3 drinks per day or >7 drinks per week? No    Do you usually eat at least 4 servings of fruit and vegetables a day, include whole grains & fiber and avoid regularly eating high fat or \"junk\" foods? Yes    Do you have any problems taking medications regularly?  No    Do you have any side effects from medications? none    Needs assistance for the following daily activities: no assistance needed    Which of the following safety concerns are present in your home?  throw rugs in the hallway and lack of grab bars in the bathroom     Hearing impairment: No    In the past 6 months, have you been bothered by leaking of urine? no    Mental Health:    In general, how would you rate your overall mental or emotional health? good  PHQ-2 Score:      Do you feel safe in your environment? Yes    Have you ever done Advance Care Planning? (For example, a Health Directive, POLST, or a discussion with a medical provider or your loved ones about your wishes): Yes, patient states has an Advance Care Planning document and will bring a copy to the clinic.    Additional concerns to address?  No    Fall risk:       Cognitive Screenin) Repeat 3 items (Leader, Season, Table)    2) Clock draw: NORMAL  3) 3 item recall: Recalls 3 objects  Results: 3 items recalled: COGNITIVE IMPAIRMENT LESS LIKELY    Mini-CogTM Copyright TARIQ Akers. Licensed by the author for use in [facility] Med[employer]24; reprinted with permission ([email or web address]). All rights reserved.      Do you have sleep apnea, " "excessive snoring or daytime drowsiness?: has a cpap machine        Reviewed and updated as needed this visit by clinical staff         Reviewed and updated as needed this visit by Provider        Social History     Tobacco Use     Smoking status: Former Smoker     Last attempt to quit: 1985     Years since quittin.5     Smokeless tobacco: Never Used   Substance Use Topics     Alcohol use: No     Alcohol/week: 0.0 standard drinks                           Current providers sharing in care for this patient include:   Patient Care Team:  Charles Skinner MD as PCP - General (Family Practice)  Charles Skinner MD as Assigned PCP    The following health maintenance items are reviewed in Epic and correct as of today:  Health Maintenance   Topic Date Due     ADVANCE CARE PLANNING  1955     PHQ-2  2020     MEDICARE ANNUAL WELLNESS VISIT  2020     FALL RISK ASSESSMENT  2020     PNEUMOCOCCAL IMMUNIZATION 65+ LOW/MEDIUM RISK (2 of 2 - PPSV23) 2020     DTAP/TDAP/TD IMMUNIZATION (2 - Tdap) 2023     COLORECTAL CANCER SCREENING  2023     LIPID  2024     HEPATITIS C SCREENING  Completed     INFLUENZA VACCINE  Completed     ZOSTER IMMUNIZATION  Completed     AORTIC ANEURYSM SCREENING (SYSTEM ASSIGNED)  Completed     IPV IMMUNIZATION  Aged Out     MENINGITIS IMMUNIZATION  Aged Out         ROS:      OBJECTIVE:   There were no vitals taken for this visit. Estimated body mass index is 29.53 kg/m  as calculated from the following:    Height as of 19: 1.753 m (5' 9\").    Weight as of 19: 90.7 kg (200 lb).  EXAM:       Diagnostic Test Results:  Labs reviewed in Epic    ASSESSMENT / PLAN:       ICD-10-CM    1. Encounter for Medicare annual wellness exam  Z00.00    2. Hypercholesterolemia  E78.00 simvastatin (ZOCOR) 10 MG tablet       COUNSELING:  Reviewed preventive health counseling, as reflected in patient instructions       Regular exercise       Healthy " "diet/nutrition       Vision screening       Dental care       Immunizations    Vaccinated for: Pneumococcal             Colon cancer screening       Prostate cancer screening       Osteoporosis Prevention/Bone Health    Estimated body mass index is 29.53 kg/m  as calculated from the following:    Height as of 4/9/19: 1.753 m (5' 9\").    Weight as of 4/9/19: 90.7 kg (200 lb).         reports that he quit smoking about 35 years ago. He has never used smokeless tobacco.      Appropriate preventive services were discussed with this patient, including applicable screening as appropriate for cardiovascular disease, diabetes, osteopenia/osteoporosis, and glaucoma.  As appropriate for age/gender, discussed screening for colorectal cancer, prostate cancer, breast cancer, and cervical cancer. Checklist reviewing preventive services available has been given to the patient.    Reviewed patients plan of care and provided an AVS. The Basic Care Plan (routine screening as documented in Health Maintenance) for Chinedu meets the Care Plan requirement. This Care Plan has been established and reviewed with the Patient.    Counseling Resources:  ATP IV Guidelines  Pooled Cohorts Equation Calculator  Breast Cancer Risk Calculator  FRAX Risk Assessment  ICSI Preventive Guidelines  Dietary Guidelines for Americans, 2010  [employer]'s MyPlate  ASA Prophylaxis  Lung CA Screening    Charles Skinner MD  [facility]  --------------------------------------------------------------------------------------------------------------------------------------    SUBJECTIVE:  Chinedu Houser is a 65 year old male who presents to the clinic today for a routine physical exam.    The patient's last physical was 4-9-19.    Cholesterol   Date Value Ref Range Status   07/01/2020 130 <200 mg/dL Final   04/23/2019 159 <200 mg/dL Final     HDL Cholesterol   Date Value Ref Range Status   07/01/2020 51 >39 mg/dL Final   04/23/2019 45 >39 mg/dL Final "     LDL Cholesterol Calculated   Date Value Ref Range Status   07/01/2020 68 <100 mg/dL Final     Comment:     Desirable:       <100 mg/dl   04/23/2019 97 <100 mg/dL Final     Comment:     Desirable:       <100 mg/dl     Triglycerides   Date Value Ref Range Status   07/01/2020 53 <150 mg/dL Final     Comment:     Fasting specimen   04/23/2019 83 <150 mg/dL Final     Comment:     Fasting specimen     No results found for: CHOLHDLRATIO   He is only taking 1/2 of the dose of simvastatin for the last 3 month(s)   The patient's last fasting lipid panel was done 1 weeks ago and the results are listed above.      The 10-year ASCVD risk score (Anish VIEIRA Jr., et al., 2013) is: 15%    Values used to calculate the score:      Age: 65 years      Sex: Male      Is Non- : No      Diabetic: No      Tobacco smoker: No      Systolic Blood Pressure: 153 mmHg      Is BP treated: No      HDL Cholesterol: 51 mg/dL      Total Cholesterol: 130 mg/dL            The patient reports that he has never been treated for high blood pressure.    The patient reports that he does take a daily aspirin.    Lab Results   Component Value Date    HCVAB Nonreactive 04/23/2019     The patient reports that he has been screened for Hepatitis C    (Screen all baby boomers once per CDC-- the generation born from 1945 through 1965 and per USPTF screen age 19 to 79 especially younger people who have used IV drugs)  He would not like to have an Hepatitis C test today    No results found for: HIAGAB  The patient reports that he has not been screened for HIV   (Screen all 15 to 64 years old)  He would not like to have an HIV test today       Immunization History   Administered Date(s) Administered     DTaP, Unspecified 12/02/2013     Flu, Unspecified 01/01/2008, 09/30/2009     Influenza (High Dose) 3 valent vaccine 10/16/2018, 10/09/2019     Influenza (IIV3) PF 11/09/2006, 10/18/2007, 10/17/2008, 08/31/2010, 08/30/2011, 08/23/2012,  08/26/2013, 08/21/2014     Influenza Vaccine IM > 6 months Valent IIV4 08/01/2016     Influenza Vaccine, 6+MO IM (QUADRIVALENT W/PRESERVATIVES) 08/24/2017     Influenza, Whole Virus 01/01/2008     Pneumo Conj 13-V (2010&after) 04/23/2019     TDAP Vaccine (Boostrix) 12/02/2013     Td (Adult), Adsorbed 01/01/2000     Zoster vaccine recombinant adjuvanted (SHINGRIX) 04/23/2019, 07/29/2019     The patient's believes that his last tetanus shot was given 7 year(s) ago.   The patient believes that he has had a Shingrix in the past  The patient believes that he has not had a PPSV23 in the past.  The patient believes that he has had a PCV13 in the past.  The patient believes that he has had a seasonal flu vaccination this fall or winter.  The patient would like to have a PPSV23.      No results found for this or any previous visit.]   The patient denies a family history of colon cancer.  The patient reports that he has had a colonoscopy. His  last colonoscopy was in 2013 and he  report that is was normal. The patient was told to have this repeated in 10 years.    The patient reports that he and his wife or partner are not using contraception as she has gone through menopause.    His currently used contraception is a vasectomy.  The patient denies a family history of diabetes.  The patient denies a family history of prostate cancer.   The patient reports that he eats or drinks 3 servings of dairy products per day.   The patient reports that he has dental appointments approximately every 6 months.  The patient reports that he  has an eye examination approximately every 2 year(s). He has an appointment(s) coming up     Do you currently smoke? No, qquit in 1985  How many years have you smoked? 10   How many packs per day did you smoke on average? 1 ppd  (if more than 30 pack year history and the patient is age 55-80 consider ordering an annual low dose radiation lung CT to screen for cancer)  (Do not order if patient has quit  more than 15 years ago or has a health condition that limits life expectancy or could not tolerate curative lung surgery)  Are you interested having a lung CT to screen for lung cancer? N/A    If the patient has smoked more that 100 cigarettes, has the patient had an imaging study (US or CT) for an AAA between the ages of 65 and 75? Yes: 2019            Patient Active Problem List   Diagnosis     Apnea spell     Arthrodesis status     Personal history of other diseases of digestive system     Contact dermatitis and eczema     Elevated PSA     Jaundice, non-     Hypercholesterolemia     Physical exam     Disorder of bursae and tendons in shoulder region     Screening for diabetes mellitus     Routine general medical examination at a health care facility     Diastasis recti     Elevated bilirubin     Personal history of smoking       Past Surgical History:   Procedure Laterality Date     ARTHROSCOPY KNEE      ,both knees     COLONOSCOPY           COLONOSCOPY  2013,Normal - follow up 10 years     TONSILLECTOMY, ADENOIDECTOMY, COMBINED      T & A       Family History   Problem Relation Age of Onset     Heart Disease Father 62        Heart Disease,MI     Family History Negative Mother         Good Health     Heart Disease Brother 50        Heart Disease,Heart disease with stenting     Heart Disease Brother 50        Heart Disease,Heart disease with stenting       Social History     Socioeconomic History     Marital status:      Spouse name: Not on file     Number of children: Not on file     Years of education: Not on file     Highest education level: Not on file   Occupational History     Occupation: OWNER   Social Needs     Financial resource strain: Not on file     Food insecurity     Worry: Not on file     Inability: Not on file     Transportation needs     Medical: Not on file     Non-medical: Not on file   Tobacco Use     Smoking status: Former Smoker     Last attempt to  "quit: 1985     Years since quittin.5     Smokeless tobacco: Never Used   Substance and Sexual Activity     Alcohol use: No     Alcohol/week: 0.0 standard drinks     Drug use: No     Types: Other     Comment: Drug use: No     Sexual activity: Not on file   Lifestyle     Physical activity     Days per week: Not on file     Minutes per session: Not on file     Stress: Not on file   Relationships     Social connections     Talks on phone: Not on file     Gets together: Not on file     Attends Scientologist service: Not on file     Active member of club or organization: Not on file     Attends meetings of clubs or organizations: Not on file     Relationship status: Not on file     Intimate partner violence     Fear of current or ex partner: Not on file     Emotionally abused: Not on file     Physically abused: Not on file     Forced sexual activity: Not on file   Other Topics Concern     Parent/sibling w/ CABG, MI or angioplasty before 65F 55M? Not Asked   Social History Narrative     with 4 children.  Works as a jeweler.    Patient is a former smoker.  quite at 29 yrs of age    Alcohol Use - no    Preload  2013       Current Outpatient Medications   Medication Sig Dispense Refill     aspirin EC 81 MG EC tablet Take 81 mg by mouth daily with food       fish oil-omega-3 fatty acids 1000 MG capsule        Glucosamine Sulfate 500 MG TABS Patient unsure of dose.       simvastatin (ZOCOR) 20 MG tablet Take 1 tablet (20 mg) by mouth At Bedtime (Patient taking differently: Take 10 mg by mouth At Bedtime ) 90 tablet 0       Review Of Systems    Genitourinary: negative and nocturia x 1 50% of nights    PHYSICAL EXAMINATION:  Blood pressure (!) 153/78, pulse (!) 45, temperature 98.1  F (36.7  C), temperature source Oral, height 1.746 m (5' 8.75\"), weight 83.9 kg (185 lb).  General appearance - healthy, alert and no distress  Skin - Skin color, texture, turgor normal. No rashes or lesions.  Head - Normocephalic. No " masses, lesions, tenderness or abnormalities  Eyes - conjunctivae/corneas clear. PERRL, EOM's intact. Fundi benign  Ears - External ears normal. Canals clear. TM's normal.  Nose/Sinuses - Nares normal. Septum midline. Mucosa normal. No drainage or sinus tenderness.  Oropharynx - Lips, mucosa, and tongue normal. Teeth and gums normal.  Neck - Neck supple. No adenopathy. Thyroid symmetric, normal size,  Lungs - Percussion normal. Good diaphragmatic excursion. Lungs clear  Heart - PMI normal. No lifts, heaves, or thrills. RRR. No murmurs, clicks gallops or rub  Abdomen - Abdomen soft, non-tender. BS normal. No masses, organomegaly  Extremities - Extremities normal. No deformities, edema, or skin discoloration.  Musculoskeletal - Spine ROM normal. Muscular strength intact.  Peripheral pulses - radial=4/4, femoral=4/4, popliteal=4/4, dorsalis pedis=4/4,  Neuro - Gait normal. Reflexes normal and symmetric. Sensation grossly WNL.  Genitalia - Penis normal. No urethral discharge. Scrotum normal to palpation. No hernia.  Rectal - Rectal negative. Prostate palpation negative.  No rectal masses or abnormalities      Orders Only on 07/01/2020   Component Date Value Ref Range Status     Sodium 07/01/2020 139  133 - 144 mmol/L Final     Potassium 07/01/2020 4.2  3.4 - 5.3 mmol/L Final     Chloride 07/01/2020 104  94 - 109 mmol/L Final     Carbon Dioxide 07/01/2020 30  20 - 32 mmol/L Final     Anion Gap 07/01/2020 5  3 - 14 mmol/L Final     Glucose 07/01/2020 97  70 - 99 mg/dL Final    Fasting specimen     Urea Nitrogen 07/01/2020 12  7 - 30 mg/dL Final     Creatinine 07/01/2020 0.81  0.66 - 1.25 mg/dL Final     GFR Estimate 07/01/2020 >90  >60 mL/min/[1.73_m2] Final    Comment: Non  GFR Calc  Starting 12/18/2018, serum creatinine based estimated GFR (eGFR) will be   calculated using the Chronic Kidney Disease Epidemiology Collaboration   (CKD-EPI) equation.       GFR Estimate If Black 07/01/2020 >90  >60  mL/min/[1.73_m2] Final    Comment:  GFR Calc  Starting 12/18/2018, serum creatinine based estimated GFR (eGFR) will be   calculated using the Chronic Kidney Disease Epidemiology Collaboration   (CKD-EPI) equation.       Calcium 07/01/2020 9.1  8.5 - 10.1 mg/dL Final     Bilirubin Total 07/01/2020 1.4* 0.2 - 1.3 mg/dL Final     Albumin 07/01/2020 3.8  3.4 - 5.0 g/dL Final     Protein Total 07/01/2020 7.2  6.8 - 8.8 g/dL Final     Alkaline Phosphatase 07/01/2020 63  40 - 150 U/L Final     ALT 07/01/2020 25  0 - 70 U/L Final     AST 07/01/2020 19  0 - 45 U/L Final     Cholesterol 07/01/2020 130  <200 mg/dL Final     Triglycerides 07/01/2020 53  <150 mg/dL Final    Fasting specimen     HDL Cholesterol 07/01/2020 51  >39 mg/dL Final     LDL Cholesterol Calculated 07/01/2020 68  <100 mg/dL Final    Desirable:       <100 mg/dl     Non HDL Cholesterol 07/01/2020 79  <130 mg/dL Final     PSA 07/01/2020 1.18  0 - 4 ug/L Final    Assay Method:  Chemiluminescence using Siemens Vista analyzer       ASSESSMENT:    ICD-10-CM    1. Encounter for Medicare annual wellness exam  Z00.00        Well-Adult Physical Exam.  Health Maintenance Due   Topic Date Due     ADVANCE CARE PLANNING  1955     PHQ-2  01/01/2020     FALL RISK ASSESSMENT  04/09/2020     PNEUMOCOCCAL IMMUNIZATION 65+ LOW/MEDIUM RISK (2 of 2 - PPSV23) 04/23/2020     Health Maintenance   Topic Date Due     ADVANCE CARE PLANNING  1955     PHQ-2  01/01/2020     FALL RISK ASSESSMENT  04/09/2020     PNEUMOCOCCAL IMMUNIZATION 65+ LOW/MEDIUM RISK (2 of 2 - PPSV23) 04/23/2020     INFLUENZA VACCINE (1) 09/01/2020     MEDICARE ANNUAL WELLNESS VISIT  07/07/2021     DTAP/TDAP/TD IMMUNIZATION (2 - Tdap) 12/02/2023     COLORECTAL CANCER SCREENING  12/11/2023     LIPID  07/01/2025     HEPATITIS C SCREENING  Completed     ZOSTER IMMUNIZATION  Completed     AORTIC ANEURYSM SCREENING (SYSTEM ASSIGNED)  Completed     IPV IMMUNIZATION  Aged Out     MENINGITIS  IMMUNIZATION  Aged Out         HEALTH CARE MAINTENENCE: The recommended screening tests and vaccinatons for this patient have been discussed as above.  The appropriate tests and vaccinations  have been ordered or declined by the patient. Please see the orders in EPIC.The patient specifically declines: n/a     Immunization Status:  up to date and documented except for PPSV23    Patient Active Problem List   Diagnosis     Apnea spell     Arthrodesis status     Personal history of other diseases of digestive system     Contact dermatitis and eczema     Elevated PSA     Jaundice, non-     Hypercholesterolemia     Physical exam     Disorder of bursae and tendons in shoulder region     Screening for diabetes mellitus     Routine general medical examination at a health care facility     Diastasis recti     Elevated bilirubin     Personal history of smoking        ATP III Guidelines  ICSI Preventive Guidelines    PLAN:   Elevated blood pressure- he would like to work on getting back into regular aerobi exercise and avoiding salt to lower his blood pressure. An educational reference regarding this subject was printed from [employer] and given to the patient.  I recommend(ed) that we see him in 3 month(s) for a blood pressure recheck   I recommended discontinuing to take a daily aspirin (81 to 325 mg)  HIV testing was discussed but the pt declined  PPSV23 recommended  Discussed calcium intake, vitamins and supplements. Recommended 1000 mg of calcium daily  Sunscreen use was recommended especially in the area of tatoos  Recommended dental exams every 6 months  Recommended eye exam every 1-2 years  Follow up in 1 year for the next preventative medical visit

## 2020-07-01 DIAGNOSIS — Z00.00 ROUTINE GENERAL MEDICAL EXAMINATION AT A HEALTH CARE FACILITY: ICD-10-CM

## 2020-07-01 DIAGNOSIS — E78.5 HYPERLIPIDEMIA LDL GOAL <160: ICD-10-CM

## 2020-07-01 DIAGNOSIS — R97.20 ELEVATED PSA: ICD-10-CM

## 2020-07-01 DIAGNOSIS — Z13.1 SCREENING FOR DIABETES MELLITUS: ICD-10-CM

## 2020-07-01 DIAGNOSIS — E78.00 HYPERCHOLESTEROLEMIA: ICD-10-CM

## 2020-07-01 LAB
ALBUMIN SERPL-MCNC: 3.8 G/DL (ref 3.4–5)
ALP SERPL-CCNC: 63 U/L (ref 40–150)
ALT SERPL W P-5'-P-CCNC: 25 U/L (ref 0–70)
ANION GAP SERPL CALCULATED.3IONS-SCNC: 5 MMOL/L (ref 3–14)
AST SERPL W P-5'-P-CCNC: 19 U/L (ref 0–45)
BILIRUB SERPL-MCNC: 1.4 MG/DL (ref 0.2–1.3)
BUN SERPL-MCNC: 12 MG/DL (ref 7–30)
CALCIUM SERPL-MCNC: 9.1 MG/DL (ref 8.5–10.1)
CHLORIDE SERPL-SCNC: 104 MMOL/L (ref 94–109)
CHOLEST SERPL-MCNC: 130 MG/DL
CO2 SERPL-SCNC: 30 MMOL/L (ref 20–32)
CREAT SERPL-MCNC: 0.81 MG/DL (ref 0.66–1.25)
GFR SERPL CREATININE-BSD FRML MDRD: >90 ML/MIN/{1.73_M2}
GLUCOSE SERPL-MCNC: 97 MG/DL (ref 70–99)
HDLC SERPL-MCNC: 51 MG/DL
LDLC SERPL CALC-MCNC: 68 MG/DL
NONHDLC SERPL-MCNC: 79 MG/DL
POTASSIUM SERPL-SCNC: 4.2 MMOL/L (ref 3.4–5.3)
PROT SERPL-MCNC: 7.2 G/DL (ref 6.8–8.8)
PSA SERPL-ACNC: 1.18 UG/L (ref 0–4)
SODIUM SERPL-SCNC: 139 MMOL/L (ref 133–144)
TRIGL SERPL-MCNC: 53 MG/DL

## 2020-07-01 PROCEDURE — 80061 LIPID PANEL: CPT | Performed by: FAMILY MEDICINE

## 2020-07-01 PROCEDURE — 80053 COMPREHEN METABOLIC PANEL: CPT | Performed by: FAMILY MEDICINE

## 2020-07-01 PROCEDURE — 84153 ASSAY OF PSA TOTAL: CPT | Performed by: FAMILY MEDICINE

## 2020-07-01 PROCEDURE — 36415 COLL VENOUS BLD VENIPUNCTURE: CPT | Performed by: FAMILY MEDICINE

## 2020-07-01 NOTE — RESULT ENCOUNTER NOTE
I have reviewed the schedule of future appointments and this patient has an appointment scheduled for 7-7-2020.    Visit date not found

## 2020-07-07 ENCOUNTER — OFFICE VISIT (OUTPATIENT)
Dept: FAMILY MEDICINE | Facility: CLINIC | Age: 65
End: 2020-07-07
Payer: COMMERCIAL

## 2020-07-07 VITALS
BODY MASS INDEX: 27.4 KG/M2 | DIASTOLIC BLOOD PRESSURE: 75 MMHG | SYSTOLIC BLOOD PRESSURE: 154 MMHG | TEMPERATURE: 98.1 F | HEIGHT: 69 IN | WEIGHT: 185 LBS | HEART RATE: 46 BPM

## 2020-07-07 DIAGNOSIS — Z00.00 ENCOUNTER FOR MEDICARE ANNUAL WELLNESS EXAM: Primary | ICD-10-CM

## 2020-07-07 DIAGNOSIS — E78.00 HYPERCHOLESTEROLEMIA: ICD-10-CM

## 2020-07-07 PROCEDURE — 99397 PER PM REEVAL EST PAT 65+ YR: CPT | Mod: 25 | Performed by: FAMILY MEDICINE

## 2020-07-07 PROCEDURE — 90471 IMMUNIZATION ADMIN: CPT | Performed by: FAMILY MEDICINE

## 2020-07-07 PROCEDURE — 90732 PPSV23 VACC 2 YRS+ SUBQ/IM: CPT | Performed by: FAMILY MEDICINE

## 2020-07-07 RX ORDER — SIMVASTATIN 10 MG
10 TABLET ORAL AT BEDTIME
Qty: 90 TABLET | Refills: 3 | Status: SHIPPED | OUTPATIENT
Start: 2020-07-07 | End: 2021-08-31

## 2020-07-07 ASSESSMENT — MIFFLIN-ST. JEOR: SCORE: 1600.56

## 2020-07-07 NOTE — NURSING NOTE
Prior to immunization administration, verified patients identity using patient s name and date of birth. Please see Immunization Activity for additional information.     Screening Questionnaire for Adult Immunization    Are you sick today?   No   Do you have allergies to medications, food, a vaccine component or latex?   No   Have you ever had a serious reaction after receiving a vaccination?   No   Do you have a long-term health problem with heart, lung, kidney, or metabolic disease (e.g., diabetes), asthma, a blood disorder, no spleen, complement component deficiency, a cochlear implant, or a spinal fluid leak?  Are you on long-term aspirin therapy?   No   Do you have cancer, leukemia, HIV/AIDS, or any other immune system problem?   No   Do you have a parent, brother, or sister with an immune system problem?   No   In the past 3 months, have you taken medications that affect  your immune system, such as prednisone, other steroids, or anticancer drugs; drugs for the treatment of rheumatoid arthritis, Crohn s disease, or psoriasis; or have you had radiation treatments?   No   Have you had a seizure, or a brain or other nervous system problem?   No   During the past year, have you received a transfusion of blood or blood    products, or been given immune (gamma) globulin or antiviral drug?   No   For women: Are you pregnant or is there a chance you could become       pregnant during the next month?   No   Have you received any vaccinations in the past 4 weeks?   No     Immunization questionnaire answers were all negative.

## 2020-08-04 DIAGNOSIS — E78.00 HYPERCHOLESTEROLEMIA: ICD-10-CM

## 2020-08-05 RX ORDER — SIMVASTATIN 20 MG
TABLET ORAL
Qty: 90 TABLET | Refills: 0 | OUTPATIENT
Start: 2020-08-05

## 2020-10-23 ENCOUNTER — ALLIED HEALTH/NURSE VISIT (OUTPATIENT)
Dept: NURSING | Facility: CLINIC | Age: 65
End: 2020-10-23
Payer: COMMERCIAL

## 2020-10-23 VITALS — DIASTOLIC BLOOD PRESSURE: 74 MMHG | SYSTOLIC BLOOD PRESSURE: 136 MMHG

## 2020-10-23 DIAGNOSIS — Z01.30 BLOOD PRESSURE CHECK: Primary | ICD-10-CM

## 2020-10-23 PROCEDURE — 99207 PR NO CHARGE LOS: CPT

## 2020-10-23 NOTE — PROGRESS NOTES
Chinedu Houser is a 65 year old patient who comes in today for a Blood Pressure check.  Initial BP:  /74      Data Unavailable  Disposition: follow-up as previously indicated by provider and results routed to provider  Deya Fabian CMA

## 2020-10-28 ENCOUNTER — TELEPHONE (OUTPATIENT)
Dept: FAMILY MEDICINE | Facility: CLINIC | Age: 65
End: 2020-10-28

## 2020-10-28 NOTE — TELEPHONE ENCOUNTER
"The patient has a virtual visit  appointment(s) tomorrow set up regarding \"referral for xray\". This is a little unusual so can someone please clarify what this is about and send back to me.   Charles Skinner MD   "

## 2020-10-29 ENCOUNTER — VIRTUAL VISIT (OUTPATIENT)
Dept: FAMILY MEDICINE | Facility: CLINIC | Age: 65
End: 2020-10-29
Payer: COMMERCIAL

## 2020-10-29 DIAGNOSIS — R03.0 ELEVATED BP WITHOUT DIAGNOSIS OF HYPERTENSION: ICD-10-CM

## 2020-10-29 DIAGNOSIS — M25.552 HIP PAIN, LEFT: Primary | ICD-10-CM

## 2020-10-29 PROCEDURE — 99213 OFFICE O/P EST LOW 20 MIN: CPT | Mod: 95 | Performed by: FAMILY MEDICINE

## 2020-10-29 NOTE — PROGRESS NOTES
"Chinedu Houser is a 65 year old male who is being evaluated via a billable telephone visit.      The patient has been notified of following:     \"This telephone visit will be conducted via a call between you and your physician/provider. We have found that certain health care needs can be provided without the need for a physical exam.  This service lets us provide the care you need with a short phone conversation.  If a prescription is necessary we can send it directly to your pharmacy.  If lab work is needed we can place an order for that and you can then stop by our lab to have the test done at a later time.    Telephone visits are billed at different rates depending on your insurance coverage. During this emergency period, for some insurers they may be billed the same as an in-person visit.  Please reach out to your insurance provider with any questions.    If during the course of the call the physician/provider feels a telephone visit is not appropriate, you will not be charged for this service.\"    Patient has given verbal consent for Telephone visit?  Yes    What phone number would you like to be contacted at? [phone]    How would you like to obtain your AVS? Ziyad Anaya     Chinedu Houser is a 65 year old male who presents via phone visit today for the following health issues:    HPI     Chiro would like him to have a left hip xr   In June he fell and hurt his right hip  He saw a chiropracter(ic)   Dr Shea Maciel  She recommend(ed) that he get an xr   Insurance wise he was told that it would be better if he got it through his primary medical provider         B/P discuss the trends.  At his physical in July we discussed his elevated blood pressure   He has been trying to do aerobic exercise 3-4 times a week.   He has been limiting sodium to less than 1500 mg a day(s)   He has a blood pressure machine and started checking his blood pressures about 6 weeks ago     The lowest he gets is " 118/63 and the highest is in the low 130s to 140's                       Review of Systems          Objective          Vitals:  No vitals were obtained today due to virtual visit.    healthy, alert and no distress  PSYCH: Alert and oriented times 3; coherent speech, normal   rate and volume, able to articulate logical thoughts, able   to abstract reason, no tangential thoughts, no hallucinations   or delusions  His affect is normal  RESP: No cough, no audible wheezing, able to talk in full sentences  Remainder of exam unable to be completed due to telephone visits            Assessment/Plan:    Assessment & Plan     (M25.552) Hip pain, left  (primary encounter diagnosis)  Comment:   Plan: XR Hip Left 2-3 Views        The patient will request(ed) a digital copy to bring to his chiropracter(ic)     (R03.0) Elevated BP without diagnosis of hypertension  Comment: he appears to be making progress with his blood pressure so I recommend(ed) he continue(s) his efforts and follow up in clinic with me in 3 month(s)          No follow-ups on file.    Charles Skinner MD  [facility]    Phone call duration:  12 minutes        I spoke with my supervisor and there is no distinct policy against patients scheduling an appointment(s) for an xray. I put the order in so please call to schedule the appointment.     Please call our [facility] Scheduling Line at [phone] to schedule.    Make sure to ask for a copy to take to your chiropracter.  Charles Skinner MD

## 2020-11-03 ENCOUNTER — ANCILLARY PROCEDURE (OUTPATIENT)
Dept: GENERAL RADIOLOGY | Facility: CLINIC | Age: 65
End: 2020-11-03
Attending: FAMILY MEDICINE
Payer: COMMERCIAL

## 2020-11-03 DIAGNOSIS — M25.552 HIP PAIN, LEFT: ICD-10-CM

## 2020-11-03 PROCEDURE — 73502 X-RAY EXAM HIP UNI 2-3 VIEWS: CPT | Performed by: RADIOLOGY

## 2020-11-03 NOTE — LETTER
November 4, 2020      Chinedu Houser  [address]  [place] [place] [address]        Dear ,    We are writing to inform you of your test results.    Chinedu,   I have reviewed the report of the imaging test or tests that we recently ordered. The results were normal or considered normal for you. The hip looks good but there is some signs of degeneration in the lower spine. .I hope that you were able to get a copy of the xray to take to your chiropracter(ic). If not let me know.     Sincerely,     Charles Skinner MD / mkr    Resulted Orders   XR Hip Left 2-3 Views    Narrative    HIP LEFT TWO TO THREE VIEWS November 3, 2020 10:42 AM     HISTORY: Hip pain, left.    COMPARISON: None.      Impression    IMPRESSION: No acute bony or soft tissue abnormality. No significant  degenerative change at the left hip. Degenerative changes are noted in  the visualized lower lumbar spine.    JEANIE MARSH MD       If you have any questions or concerns, please call the clinic at the number listed above.       Sincerely,        [facility] XRAY ROOM 2

## 2020-11-04 NOTE — RESULT ENCOUNTER NOTE
Chinedu,  I have reviewed the report of the imaging test or tests that we recently ordered. The results were normal or considered normal for you. The hip looks good but there is some signs of degeneration in the lower spine. .I hope that you were able to get a copy of the xray to take to your chiropracter(ic). If not let me know.   Sincerely,   Charles Skinner MD

## 2021-01-26 ENCOUNTER — OFFICE VISIT (OUTPATIENT)
Dept: FAMILY MEDICINE | Facility: CLINIC | Age: 66
End: 2021-01-26
Payer: COMMERCIAL

## 2021-01-26 VITALS
BODY MASS INDEX: 27.43 KG/M2 | HEIGHT: 68 IN | SYSTOLIC BLOOD PRESSURE: 121 MMHG | DIASTOLIC BLOOD PRESSURE: 64 MMHG | OXYGEN SATURATION: 100 % | TEMPERATURE: 97.6 F | HEART RATE: 49 BPM | WEIGHT: 181 LBS

## 2021-01-26 DIAGNOSIS — R68.89 BLOOD PRESSURE ALTERATION: Primary | ICD-10-CM

## 2021-01-26 PROCEDURE — 99213 OFFICE O/P EST LOW 20 MIN: CPT | Performed by: FAMILY MEDICINE

## 2021-01-26 RX ORDER — MULTIVIT WITH MINERALS/LUTEIN
1000 TABLET ORAL DAILY
COMMUNITY

## 2021-01-26 ASSESSMENT — PAIN SCALES - GENERAL: PAINLEVEL: NO PAIN (0)

## 2021-01-26 ASSESSMENT — MIFFLIN-ST. JEOR: SCORE: 1570.51

## 2021-01-26 NOTE — NURSING NOTE
"Chief Complaint   Patient presents with     Hypertension     Health Maintenance     PHQ2       Initial BP (!) 159/76   Pulse (!) 49   Temp 97.6  F (36.4  C) (Tympanic)   Ht 1.727 m (5' 8\")   Wt 82.1 kg (181 lb)   SpO2 100%   BMI 27.52 kg/m   Estimated body mass index is 27.52 kg/m  as calculated from the following:    Height as of this encounter: 1.727 m (5' 8\").    Weight as of this encounter: 82.1 kg (181 lb).  Medication Reconciliation: complete  Leanne Stuart CMA  "

## 2021-01-26 NOTE — PROGRESS NOTES
"  SUBJECTIVE:  Chinedu Houser is an 65 year old male who presents for a follow up evaluation of his blood pressure. He was at the dentist yesterday and had his blood pressure measured via a wrist cuff. It was elevated and he was told to come to clinic.  The patient has never been on blood pressure medication.   He is exercising regularly.   He is avoiding sodium except where he now lives serves food at lunch and some of it is pretty salty and he ate their yesterday before his dental appointment(s).       Patient Active Problem List   Diagnosis     Apnea spell     Arthrodesis status     Personal history of other diseases of digestive system     Contact dermatitis and eczema     Elevated PSA     Jaundice, non-     Hypercholesterolemia     Physical exam     Disorder of bursae and tendons in shoulder region     Screening for diabetes mellitus     Routine general medical examination at a health care facility     Diastasis recti     Elevated bilirubin     Personal history of smoking       Is the HYPERTENSION goal on the problem list? No      Use of agents associated with hypertension: none  Current Outpatient Medications   Medication     fish oil-omega-3 fatty acids 1000 MG capsule     Glucosamine Sulfate 500 MG TABS     magnesium 250 MG tablet     Methylsulfonylmethane (MSM PO)     simvastatin (ZOCOR) 10 MG tablet     vitamin C (ASCORBIC ACID) 1000 MG TABS     Zinc Sulfate (ZINC 15 PO)     No current facility-administered medications for this visit.          No Known Allergies    Social History     Tobacco Use     Smoking status: Former Smoker     Packs/day: 0.00     Years: 5.00     Pack years: 0.00     Start date: 6/15/1972     Quit date: 1985     Years since quittin.0     Smokeless tobacco: Never Used   Substance Use Topics     Alcohol use: No     Alcohol/week: 0.0 standard drinks       OBJECTIVE:  BP (!) 159/76   Pulse (!) 49   Temp 97.6  F (36.4  C) (Tympanic)   Ht 1.727 m (5' 8\")   Wt 82.1 kg " (181 lb)   SpO2 100%   BMI 27.52 kg/m      Heart: negative, PMI normal. No lifts, heaves, or thrills. RRR. No murmurs, clicks gallops or rub  Lower Extremities:No edema on right and No  edema on the left        No results found for any visits on 01/26/21.    The 10-year ASCVD risk score (Anish VIEIRA Jr., et al., 2013) is: 16%    Values used to calculate the score:      Age: 65 years      Sex: Male      Is Non- : No      Diabetic: No      Tobacco smoker: No      Systolic Blood Pressure: 159 mmHg      Is BP treated: No      HDL Cholesterol: 51 mg/dL      Total Cholesterol: 130 mg/dL    ASSESSMENT:  Labile blood pressure       Plan:  - Medication: not indicated      The patient was advised to do the following therapuetic life style changes  - Dietary sodium restriction and increase potassium and Calcium intake  - Regular aerobic exercise  - Weight loss  - Discontinue smoking if applicable  - Avoid regular NSAID use if applicable  - Avoid regular decongestant use if applicable  - Follow up in clinic in 6 months for a complete physical exam

## 2021-08-14 ENCOUNTER — HEALTH MAINTENANCE LETTER (OUTPATIENT)
Age: 66
End: 2021-08-14

## 2021-08-21 ENCOUNTER — DOCUMENTATION ONLY (OUTPATIENT)
Dept: LAB | Facility: CLINIC | Age: 66
End: 2021-08-21

## 2021-08-21 DIAGNOSIS — Z13.1 SCREENING FOR DIABETES MELLITUS: ICD-10-CM

## 2021-08-21 DIAGNOSIS — E78.00 HYPERCHOLESTEROLEMIA: Primary | ICD-10-CM

## 2021-08-21 DIAGNOSIS — R97.20 ELEVATED PSA: ICD-10-CM

## 2021-08-24 ASSESSMENT — ENCOUNTER SYMPTOMS
HEADACHES: 0
ABDOMINAL PAIN: 0
CHILLS: 0
HEARTBURN: 0
PARESTHESIAS: 0
EYE PAIN: 0
DYSURIA: 0
HEMATOCHEZIA: 0
NERVOUS/ANXIOUS: 0
WEAKNESS: 0
NAUSEA: 0
MYALGIAS: 0
ARTHRALGIAS: 0
SHORTNESS OF BREATH: 0
HEMATURIA: 0
FEVER: 0
PALPITATIONS: 1
COUGH: 0
DIARRHEA: 0
DIZZINESS: 0
FREQUENCY: 0
JOINT SWELLING: 0
CONSTIPATION: 0
SORE THROAT: 0

## 2021-08-24 ASSESSMENT — ACTIVITIES OF DAILY LIVING (ADL): CURRENT_FUNCTION: NO ASSISTANCE NEEDED

## 2021-08-26 ENCOUNTER — LAB (OUTPATIENT)
Dept: LAB | Facility: CLINIC | Age: 66
End: 2021-08-26
Payer: COMMERCIAL

## 2021-08-26 DIAGNOSIS — R97.20 ELEVATED PSA: ICD-10-CM

## 2021-08-26 DIAGNOSIS — Z13.1 SCREENING FOR DIABETES MELLITUS: ICD-10-CM

## 2021-08-26 DIAGNOSIS — E78.00 HYPERCHOLESTEROLEMIA: ICD-10-CM

## 2021-08-26 LAB
ALBUMIN SERPL-MCNC: 3.8 G/DL (ref 3.4–5)
ALP SERPL-CCNC: 54 U/L (ref 40–150)
ALT SERPL W P-5'-P-CCNC: 30 U/L (ref 0–70)
ANION GAP SERPL CALCULATED.3IONS-SCNC: 3 MMOL/L (ref 3–14)
AST SERPL W P-5'-P-CCNC: 21 U/L (ref 0–45)
BILIRUB SERPL-MCNC: 1.2 MG/DL (ref 0.2–1.3)
BUN SERPL-MCNC: 12 MG/DL (ref 7–30)
CALCIUM SERPL-MCNC: 8.7 MG/DL (ref 8.5–10.1)
CHLORIDE BLD-SCNC: 108 MMOL/L (ref 94–109)
CHOLEST SERPL-MCNC: 157 MG/DL
CO2 SERPL-SCNC: 30 MMOL/L (ref 20–32)
CREAT SERPL-MCNC: 0.85 MG/DL (ref 0.66–1.25)
FASTING STATUS PATIENT QL REPORTED: YES
GFR SERPL CREATININE-BSD FRML MDRD: 90 ML/MIN/1.73M2
GLUCOSE BLD-MCNC: 97 MG/DL (ref 70–99)
HDLC SERPL-MCNC: 58 MG/DL
LDLC SERPL CALC-MCNC: 88 MG/DL
NONHDLC SERPL-MCNC: 99 MG/DL
POTASSIUM BLD-SCNC: 4.3 MMOL/L (ref 3.4–5.3)
PROT SERPL-MCNC: 7.2 G/DL (ref 6.8–8.8)
PSA SERPL-MCNC: 0.98 UG/L (ref 0–4)
SODIUM SERPL-SCNC: 141 MMOL/L (ref 133–144)
TRIGL SERPL-MCNC: 54 MG/DL

## 2021-08-26 PROCEDURE — G0103 PSA SCREENING: HCPCS

## 2021-08-26 PROCEDURE — 80053 COMPREHEN METABOLIC PANEL: CPT

## 2021-08-26 PROCEDURE — 80061 LIPID PANEL: CPT

## 2021-08-26 PROCEDURE — 36415 COLL VENOUS BLD VENIPUNCTURE: CPT

## 2021-08-30 ASSESSMENT — ENCOUNTER SYMPTOMS
PARESTHESIAS: 0
DIZZINESS: 0
WEAKNESS: 0
CHILLS: 0
JOINT SWELLING: 0
MYALGIAS: 0
EYE PAIN: 0
SORE THROAT: 0
COUGH: 0
SHORTNESS OF BREATH: 0
HEARTBURN: 0
NAUSEA: 0
HEMATOCHEZIA: 0
HEADACHES: 0
CONSTIPATION: 0
FEVER: 0
NERVOUS/ANXIOUS: 0
HEMATURIA: 0
DIARRHEA: 0
ABDOMINAL PAIN: 0
ARTHRALGIAS: 0
FREQUENCY: 0
DYSURIA: 0
PALPITATIONS: 1

## 2021-08-30 ASSESSMENT — ACTIVITIES OF DAILY LIVING (ADL): CURRENT_FUNCTION: NO ASSISTANCE NEEDED

## 2021-08-30 NOTE — PATIENT INSTRUCTIONS
Patient Education   Personalized Prevention Plan  You are due for the preventive services outlined below.  Your care team is available to assist you in scheduling these services.  If you have already completed any of these items, please share that information with your care team to update in your medical record.  Health Maintenance Due   Topic Date Due     ANNUAL REVIEW OF HM ORDERS  Never done     Annual Wellness Visit  07/07/2021     FALL RISK ASSESSMENT  07/07/2021     Flu Vaccine (1) 09/01/2021           Patient Education     Preventing Osteoporosis: Meeting Your Calcium Needs    Your body needs calcium to build and repair bones. But it can't make calcium on its own. That's why it's important to eat calcium-rich foods. Some foods are naturally rich in calcium. Others have calcium added (fortified). It's best to get calcium from the foods you eat. But if you can't get enough, you may want to take calcium supplements. To meet your daily calcium needs, try the foods listed below.  Dairy Fish & beans Other sources   Source   Calcium (mg) per serving   Source   Calcium (mg) per serving   Source   Calcium (mg) per serving   Low-fat yogurt, plain   415 mg/8 oz.   Sardines, Atlantic, canned, with bones   351 mg/3 oz.   Oatmeal, instant, fortified   215 mg/1 cup   Nonfat milk   302 mg/1 cup   [place], sockeye, canned, with bones   239 mg/3 oz.   Tofu made with calcium sulfate   204 mg/3 oz.   Low-fat milk   297 mg/1 cup   Soybeans, fresh, boiled   131 mg/1/2 cup   Collards   179 mg/1/2 cup   Swiss cheese   272 mg/1 oz.   White beans, cooked   81 mg/1/2 cup   English muffin, whole wheat   175 mg/1 muffin   Cheddar cheese   205 mg/1 oz.   Navy beans, cooked   79 mg/1/2 cup   Kale   90 mg/1/2 cup   Ice cream strawberry   79 mg/1/2 cup           Orange, navel   56 mg/1 medium   Note: Calcium levels may vary depending on brand and size.  Daily calcium needs  14 to 18 years old: 1,300 mg  19 to 30 years old: 1,000  mg  31 to 50 years old: 1,000 mg  51 to 70 years old, women: 1,200 mg  51 to 70 years old, men: 1,000 mg  Pregnant or nursin to 18 years old: 1,300 mg, 19 to 50 years old: 1,000 mg  Older than 70 (women and men): 1,200 mg   Henry last reviewed this educational content on 2018-2021 [employer]. All rights reserved. This information is not intended as a substitute for professional medical care. Always follow your healthcare professional's instructions.

## 2021-08-30 NOTE — PROGRESS NOTES
"SUBJECTIVE:   Chinedu Houser is a 66 year old male who presents for Preventive Visit.      Patient has been advised of split billing requirements and indicates understanding: Yes   Are you in the first 12 months of your Medicare coverage?  No    Healthy Habits:     In general, how would you rate your overall health?  Good    Frequency of exercise:  4-5 days/week    Duration of exercise:  45-60 minutes    Do you usually eat at least 4 servings of fruit and vegetables a day, include whole grains    & fiber and avoid regularly eating high fat or \"junk\" foods?  Yes    Taking medications regularly:  Yes    Medication side effects:  Not applicable    Ability to successfully perform activities of daily living:  No assistance needed    Home Safety:  No safety concerns identified    Hearing Impairment:  Need to ask people to speak up or repeat themselves, find that men's voices are easier to understand than woman's and difficulty understanding soft or whispered speech    In the past 6 months, have you been bothered by leaking of urine?  No    In general, how would you rate your overall mental or emotional health?  Excellent      PHQ-2 Total Score: 0    Additional concerns today:  No    Do you feel safe in your environment? Yes    Have you ever done Advance Care Planning? (For example, a Health Directive, POLST, or a discussion with a medical provider or your loved ones about your wishes): Yes, patient states has an Advance Care Planning document and will bring a copy to the clinic.       Fall risk       Cognitive Screening   1) Repeat 3 items (Leader, Season, Table)    2) Clock draw: NORMAL  3) 3 item recall: Recalls 3 objects  Results: 3 items recalled: COGNITIVE IMPAIRMENT LESS LIKELY    Mini-CogTM Copyright TARIQ Akers. Licensed by the author for use in [facility]; reprinted with permission ([email or web address]@.[email or web address]). All rights reserved.      Do you have sleep apnea, excessive snoring or daytime drowsiness?: " no    Reviewed and updated as needed this visit by clinical staff  Tobacco  Allergies  Meds   Med Hx  Surg Hx  Fam Hx  Soc Hx        Reviewed and updated as needed this visit by Provider  Tobacco               Social History     Tobacco Use     Smoking status: Former Smoker     Packs/day: 0.00     Years: 5.00     Pack years: 0.00     Types: Cigarettes     Start date: 6/15/1972     Quit date: 1985     Years since quittin.6     Smokeless tobacco: Never Used   Substance Use Topics     Alcohol use: No     Alcohol/week: 0.0 standard drinks         Alcohol Use 2021   Prescreen: >3 drinks/day or >7 drinks/week? Not Applicable               Current providers sharing in care for this patient include:   Patient Care Team:  Charles Skinner MD as PCP - General (Family Practice)  Charles Skinner MD as Assigned PCP    The following health maintenance items are reviewed in Epic and correct as of today:  Health Maintenance Due   Topic Date Due     ANNUAL REVIEW OF HM ORDERS  Never done     FALL RISK ASSESSMENT  2021     INFLUENZA VACCINE (1) 2021               Review of Systems   Constitutional: Negative for chills and fever.   HENT: Negative for congestion, ear pain, hearing loss and sore throat.    Eyes: Negative for pain and visual disturbance.   Respiratory: Negative for cough and shortness of breath.    Cardiovascular: Positive for palpitations. Negative for chest pain and peripheral edema.   Gastrointestinal: Negative for abdominal pain, constipation, diarrhea, heartburn, hematochezia and nausea.   Genitourinary: Negative for discharge, dysuria, frequency, genital sores, hematuria, impotence and urgency.   Musculoskeletal: Negative for arthralgias, joint swelling and myalgias.   Skin: Negative for rash.   Neurological: Negative for dizziness, weakness, headaches and paresthesias.   Psychiatric/Behavioral: Negative for mood changes. The patient is not nervous/anxious.          OBJECTIVE:  "  /62   Pulse 65   Temp 97.7  F (36.5  C) (Tympanic)   Wt 81.2 kg (179 lb)   SpO2 98%   BMI 27.22 kg/m   Estimated body mass index is 27.22 kg/m  as calculated from the following:    Height as of 1/26/21: 1.727 m (5' 8\").    Weight as of this encounter: 81.2 kg (179 lb).  Physical Exam      Diagnostic Test Results:  Labs reviewed in Epic    ASSESSMENT / PLAN:       ICD-10-CM    1. Encounter for Medicare annual wellness exam  Z00.00    2. Hypercholesterolemia  E78.00 simvastatin (ZOCOR) 10 MG tablet   3. Hearing difficulty, unspecified laterality  H91.90 Adult Audiology Referral       Patient has been advised of split billing requirements and indicates understanding: Yes  COUNSELING:  Reviewed preventive health counseling, as reflected in patient instructions       Regular exercise       Healthy diet/nutrition       Vision screening       Hearing screening       Dental care       Aspirin prophylaxis        Hepatitis C screening       HIV screening for high risk patient       Colon cancer screening       Prostate cancer screening    Estimated body mass index is 27.22 kg/m  as calculated from the following:    Height as of 1/26/21: 1.727 m (5' 8\").    Weight as of this encounter: 81.2 kg (179 lb).        He reports that he quit smoking about 36 years ago. His smoking use included cigarettes. He started smoking about 49 years ago. He smoked 0.00 packs per day for 5.00 years. He has never used smokeless tobacco.      Appropriate preventive services were discussed with this patient, including applicable screening as appropriate for cardiovascular disease, diabetes, osteopenia/osteoporosis, and glaucoma.  As appropriate for age/gender, discussed screening for colorectal cancer, prostate cancer, breast cancer, and cervical cancer. Checklist reviewing preventive services available has been given to the patient.    Reviewed patients plan of care and provided an AVS. The Basic Care Plan (routine screening as " documented in Health Maintenance) for Chinedu meets the Care Plan requirement. This Care Plan has been established and reviewed with the Patient.    Counseling Resources:  ATP IV Guidelines  Pooled Cohorts Equation Calculator  Breast Cancer Risk Calculator  Breast Cancer: Medication to Reduce Risk  FRAX Risk Assessment  ICSI Preventive Guidelines  Dietary Guidelines for Americans, 2010  USDA's MyPlate  ASA Prophylaxis  Lung CA Screening    Charles Skinner MD  [facility]    Identified Health Risks:  --------------------------------------------------------------------------------------------------------------------------------------  SUBJECTIVE:  Chinedu Houser is a 66 year old male who presents to the clinic today for a routine physical exam.    The patient's last physical was  7-7-20    Cholesterol   Date Value Ref Range Status   08/26/2021 157 <200 mg/dL Final     Comment:     Age 0-19 years  Desirable: <170 mg/dL  Borderline high:  170-199 mg/dl  High:            >199 mg/dl    Age 20 years and older  Desirable: <200 mg/dL   07/01/2020 130 <200 mg/dL Final   04/23/2019 159 <200 mg/dL Final     HDL Cholesterol   Date Value Ref Range Status   07/01/2020 51 >39 mg/dL Final   04/23/2019 45 >39 mg/dL Final     Direct Measure HDL   Date Value Ref Range Status   08/26/2021 58 >=40 mg/dL Final     Comment:     0-19 years:       Greater than or equal to 45 mg/dL   Low: Less than 40 mg/dL   Borderline low: 40-44 mg/dL     20 years and older:   Female: Greater than or equal to 50 mg/dL   Male:   Greater than or equal to 40 mg/dL          LDL Cholesterol Calculated   Date Value Ref Range Status   08/26/2021 88 <=100 mg/dL Final     Comment:     Age 0-19 years:  Desirable: 0-110 mg/dL   Borderline high: 110-129 mg/dL   High: >= 130 mg/dL    Age 20 years and older:  Desirable: <100mg/dL  Above desirable: 100-129 mg/dL   Borderline high: 130-159 mg/dL   High: 160-189 mg/dL   Very high: >= 190 mg/dL    07/01/2020 66 <100 mg/dL Final     Comment:     Desirable:       <100 mg/dl   04/23/2019 97 <100 mg/dL Final     Comment:     Desirable:       <100 mg/dl     Triglycerides   Date Value Ref Range Status   08/26/2021 54 <150 mg/dL Final     Comment:     0-9 years:  Normal:    Less than 75 mg/dL  Borderline high:  75-99 mg/dL  High:             Greater than or equal to 100 mg/dL    0-19 years:  Normal:    Less than 90 mg/dL  Borderline high:   mg/dL  High:             Greater than or equal to 130 mg/dL    20 years and older:  Normal:    Less than 150 mg/dL  Borderline high:  150-199 mg/dL  High:             200-499 mg/dL  Very high:   Greater than or equal to 500 mg/dL   07/01/2020 53 <150 mg/dL Final     Comment:     Fasting specimen   04/23/2019 83 <150 mg/dL Final     Comment:     Fasting specimen     No results found for: CHOLHDLRATIO  The patient's last fasting lipid panel was done 1 weeks ago and the results are listed above.        The 10-year ASCVD risk score (Anish ISHA Jr., et al., 2013) is: 14.5%    Values used to calculate the score:      Age: 66 years      Sex: Male      Is Non- : No      Diabetic: No      Tobacco smoker: No      Systolic Blood Pressure: 138 mmHg      Is BP treated: No      HDL Cholesterol: 58 mg/dL      Total Cholesterol: 157 mg/dL            The patient reports that he has never been treated for high blood pressure.  The patient reports that he does not take a daily aspirin.    Lab Results   Component Value Date    HCVAB Nonreactive 04/23/2019     The patient reports that he has been screened for Hepatitis C    (Screen all baby boomers once per CDC-- the generation born from 1945 through 1965 and per USPTF screen age 19 to 79 especially younger people who have used IV drugs)  He would not like to have an Hepatitis C test today    No results found for: NATO  The patient reports that he has not been screened for HIV   (Screen all 15 to 64 years old)  He would  not like to have an HIV test today      Immunization History   Administered Date(s) Administered     COVID-19,PF,Pfizer 03/04/2021, 03/25/2021     DTaP, Unspecified 12/02/2013     Flu, Unspecified 01/01/2008, 09/30/2009     Influenza (High Dose) 3 valent vaccine 10/16/2018, 10/09/2019, 10/12/2020     Influenza (IIV3) PF 11/09/2006, 10/18/2007, 10/17/2008, 08/31/2010, 08/30/2011, 08/23/2012, 08/26/2013, 08/21/2014     Influenza Vaccine IM > 6 months Valent IIV4 08/01/2016     Influenza Vaccine, 6+MO IM (QUADRIVALENT W/PRESERVATIVES) 08/24/2017     Influenza, Whole Virus 01/01/2008     Pneumo Conj 13-V (2010&after) 04/23/2019     Pneumococcal 23 valent 07/07/2020     TDAP Vaccine (Boostrix) 12/02/2013     Td (Adult), Adsorbed 01/01/2000     Zoster vaccine recombinant adjuvanted (SHINGRIX) 04/23/2019, 07/29/2019     The patient's believes that his last tetanus shot was given 8 year(s) ago.   The patient believes that he has had a Shingrix in the past  The patient believes that he has had a PPSV23 in the past.  The patient believes that he has had a PCV13 in the past.  The patient believes that he has not had a seasonal flu vaccination this fall or winter.  The patient would like to have a no vaccinations today      No results found for this or any previous visit.]   The patient denies a family history of colon cancer.  The patient reports that he has had a colonoscopy. His  last colonoscopy was in 2013 and he  report that is was normal. The patient was told to have this repeated in 10 years.    The patient reports that he and his wife or partner are not using contraception as she has gone through menopause.  The patient denies a family history of diabetes.    The patient reports that he eats or drinks 2 servings of dairy products per day. He does not take a calcium supplement at all.  The patient reports that he has dental appointments approximately every 12 months.  The patient reports that he  has an eye examination  approximately every 2 year(s).    Do you currently smoke? No, quit in   How many years have you smoked?    How many packs per day did you smoke on average? N/A  (if more than 30 pack year history and the patient is age 55-80 consider ordering an annual low dose radiation lung CT to screen for cancer)  (Do not order if patient has quit more than 15 years ago or has a health condition that limits life expectancy or could not tolerate curative lung surgery)  Are you interested having a lung CT to screen for lung cancer? N/A    If the patient has smoked more that 100 cigarettes, has the patient had an imaging study (US or CT) for an AAA between the ages of 65 and 75? Yes: 2019            Patient Active Problem List   Diagnosis     Apnea spell     Arthrodesis status     Personal history of other diseases of digestive system     Contact dermatitis and eczema     Elevated PSA     Jaundice, non-     Hypercholesterolemia     Physical exam     Disorder of bursae and tendons in shoulder region     Screening for diabetes mellitus     Routine general medical examination at a health care facility     Diastasis recti     Elevated bilirubin     Personal history of smoking       Past Surgical History:   Procedure Laterality Date     ARTHROSCOPY KNEE      ,both knees     COLONOSCOPY           COLONOSCOPY  2013,Normal - follow up 10 years     TONSILLECTOMY, ADENOIDECTOMY, COMBINED      T & A       Family History   Problem Relation Age of Onset     Heart Disease Father 62        Heart Disease,MI     Family History Negative Mother         Good Health     Heart Disease Brother 50        Heart Disease,Heart disease with stenting     Hyperlipidemia Brother      Heart Disease Brother 50        Heart Disease,Heart disease with stenting     Irregular heart beat Sister      Heart Disease Sister        Social History     Socioeconomic History     Marital status:      Spouse name: Not on file      Number of children: Not on file     Years of education: Not on file     Highest education level: Not on file   Occupational History     Occupation: OWNER   Tobacco Use     Smoking status: Former Smoker     Packs/day: 0.00     Years: 5.00     Pack years: 0.00     Types: Cigarettes     Start date: 6/15/1972     Quit date: 1985     Years since quittin.6     Smokeless tobacco: Never Used   Vaping Use     Vaping Use: Never used   Substance and Sexual Activity     Alcohol use: No     Alcohol/week: 0.0 standard drinks     Drug use: No     Types: Other     Comment: Drug use: No     Sexual activity: Not Currently     Partners: Female     Birth control/protection: None   Other Topics Concern     Parent/sibling w/ CABG, MI or angioplasty before 65F 55M? Yes   Social History Narrative     with 4 children.  Works as a jeweler.    Patient is a former smoker.  quite at 29 yrs of age    Alcohol Use - no    Preload  2013     Social Determinants of Health     Financial Resource Strain:      Difficulty of Paying Living Expenses:    Food Insecurity:      Worried About Running Out of Food in the Last Year:      Ran Out of Food in the Last Year:    Transportation Needs:      Lack of Transportation (Medical):      Lack of Transportation (Non-Medical):    Physical Activity:      Days of Exercise per Week:      Minutes of Exercise per Session:    Stress:      Feeling of Stress :    Social Connections:      Frequency of Communication with Friends and Family:      Frequency of Social Gatherings with Friends and Family:      Attends [employer] Services:      Active Member of Clubs or Organizations:      Attends Club or Organization Meetings:      Marital Status:    Intimate Partner Violence:      Fear of Current or Ex-Partner:      Emotionally Abused:      Physically Abused:      Sexually Abused:        Current Outpatient Medications   Medication Sig Dispense Refill     fish oil-omega-3 fatty acids 1000 MG capsule         Glucosamine Sulfate 500 MG TABS 1,500 mg        MAGNESIUM PO Take 1 tablet by mouth daily 75 mg       Methylsulfonylmethane (MSM PO) Take 1,500 mg by mouth        simvastatin (ZOCOR) 10 MG tablet Take 1 tablet (10 mg) by mouth At Bedtime 90 tablet 3     vitamin C (ASCORBIC ACID) 1000 MG TABS Take 1,000 mg by mouth daily       Vitamin D, Cholecalciferol, 10 MCG (400 UNIT) TABS        Zinc Sulfate (ZINC 15 PO) Take 54 mg by mouth            PHYSICAL EXAMINATION:  Blood pressure 138/62, pulse 65, temperature 97.7  F (36.5  C), temperature source Tympanic, weight 81.2 kg (179 lb), SpO2 98 %.   Wt Readings from Last 4 Encounters:   08/31/21 81.2 kg (179 lb)   01/26/21 82.1 kg (181 lb)   07/07/20 83.9 kg (185 lb)   04/09/19 90.7 kg (200 lb)       General appearance - healthy, alert and no distress  Skin - Skin color, texture, turgor normal. No rashes or lesions.  Head - Normocephalic. No masses, lesions, tenderness or abnormalities  Eyes - conjunctivae/corneas clear. PERRL, EOM's intact. Fundi benign  Ears - External ears normal. Canals clear. TM's normal.  Nose/Sinuses - Nares normal. Septum midline. Mucosa normal. No drainage or sinus tenderness.  Oropharynx - Lips, mucosa, and tongue normal. Teeth and gums normal.  Neck - Neck supple. No adenopathy. Thyroid symmetric, normal size,  Lungs - Percussion normal. Good diaphragmatic excursion. Lungs clear  Heart - PMI normal. No lifts, heaves, or thrills. RRR. No murmurs, clicks gallops or rub  Abdomen - Abdomen soft, non-tender. BS normal. No masses, organomegaly  Extremities - Extremities normal. No deformities, edema, or skin discoloration.  Musculoskeletal - Spine ROM normal. Muscular strength intact.  Peripheral pulses - radial=4/4, femoral=4/4, popliteal=4/4, dorsalis pedis=4/4,  Neuro - Gait normal. Reflexes normal and symmetric. Sensation grossly WNL.  Genitalia - Penis normal. No urethral discharge. Scrotum normal to palpation. No hernia.  Rectal - Rectal negative.  Prostate palpation negative.  No rectal masses or abnormalities      Lab on 08/26/2021   Component Date Value Ref Range Status     Prostate Specific Antigen Screen 08/26/2021 0.98  0.00 - 4.00 ug/L Final     Sodium 08/26/2021 141  133 - 144 mmol/L Final     Potassium 08/26/2021 4.3  3.4 - 5.3 mmol/L Final     Chloride 08/26/2021 108  94 - 109 mmol/L Final     Carbon Dioxide (CO2) 08/26/2021 30  20 - 32 mmol/L Final     Anion Gap 08/26/2021 3  3 - 14 mmol/L Final     Urea Nitrogen 08/26/2021 12  7 - 30 mg/dL Final     Creatinine 08/26/2021 0.85  0.66 - 1.25 mg/dL Final     Calcium 08/26/2021 8.7  8.5 - 10.1 mg/dL Final     Glucose 08/26/2021 97  70 - 99 mg/dL Final     Alkaline Phosphatase 08/26/2021 54  40 - 150 U/L Final     AST 08/26/2021 21  0 - 45 U/L Final     ALT 08/26/2021 30  0 - 70 U/L Final     Protein Total 08/26/2021 7.2  6.8 - 8.8 g/dL Final     Albumin 08/26/2021 3.8  3.4 - 5.0 g/dL Final     Bilirubin Total 08/26/2021 1.2  0.2 - 1.3 mg/dL Final     GFR Estimate 08/26/2021 90  >60 mL/min/1.73m2 Final    As of July 11, 2021, eGFR is calculated by the CKD-EPI creatinine equation, without race adjustment. eGFR can be influenced by muscle mass, exercise, and diet. The reported eGFR is an estimation only and is only applicable if the renal function is stable.     Cholesterol 08/26/2021 157  <200 mg/dL Final    Age 0-19 years  Desirable: <170 mg/dL  Borderline high:  170-199 mg/dl  High:            >199 mg/dl    Age 20 years and older  Desirable: <200 mg/dL     Triglycerides 08/26/2021 54  <150 mg/dL Final    0-9 years:  Normal:    Less than 75 mg/dL  Borderline high:  75-99 mg/dL  High:             Greater than or equal to 100 mg/dL    0-19 years:  Normal:    Less than 90 mg/dL  Borderline high:   mg/dL  High:             Greater than or equal to 130 mg/dL    20 years and older:  Normal:    Less than 150 mg/dL  Borderline high:  150-199 mg/dL  High:             200-499 mg/dL  Very high:   Greater than  or equal to 500 mg/dL     Direct Measure HDL 08/26/2021 58  >=40 mg/dL Final    0-19 years:       Greater than or equal to 45 mg/dL   Low: Less than 40 mg/dL   Borderline low: 40-44 mg/dL     20 years and older:   Female: Greater than or equal to 50 mg/dL   Male:   Greater than or equal to 40 mg/dL          LDL Cholesterol Calculated 08/26/2021 88  <=100 mg/dL Final    Age 0-19 years:  Desirable: 0-110 mg/dL   Borderline high: 110-129 mg/dL   High: >= 130 mg/dL    Age 20 years and older:  Desirable: <100mg/dL  Above desirable: 100-129 mg/dL   Borderline high: 130-159 mg/dL   High: 160-189 mg/dL   Very high: >= 190 mg/dL     Non HDL Cholesterol 08/26/2021 99  <130 mg/dL Final    0-19 years:  Desirable:          Less than 120 mg/dL  Borderline high:   120-144 mg/dL  High:                   Greater than or equal to 145 mg/dL    20 years and older:  Desirable:          130 mg/dL  Above Desirable: 130-159 mg/dL  Borderline high:   160-189 mg/dL  High:               190-219 mg/dL  Very high:     Greater than or equal to 220 mg/dL     Patient Fasting > 8hrs? 08/26/2021 Yes   Final       ASSESSMENT:    ICD-10-CM    1. Encounter for Medicare annual wellness exam  Z00.00    2. Hypercholesterolemia  E78.00 simvastatin (ZOCOR) 10 MG tablet   3. Hearing difficulty, unspecified laterality  H91.90 Adult Audiology Referral       Well-Adult Physical Exam.  Health Maintenance Due   Topic Date Due     ANNUAL REVIEW OF HM ORDERS  Never done     FALL RISK ASSESSMENT  07/07/2021     INFLUENZA VACCINE (1) 09/01/2021     Health Maintenance   Topic Date Due     ANNUAL REVIEW OF HM ORDERS  Never done     FALL RISK ASSESSMENT  07/07/2021     INFLUENZA VACCINE (1) 09/01/2021     MEDICARE ANNUAL WELLNESS VISIT  08/31/2022     DTAP/TDAP/TD IMMUNIZATION (2 - Td or Tdap) 12/02/2023     COLORECTAL CANCER SCREENING  12/11/2023     LIPID  08/26/2026     ADVANCE CARE PLANNING  08/31/2026     HEPATITIS C SCREENING  Completed     PHQ-2  Completed      Pneumococcal Vaccine: 65+ Years  Completed     ZOSTER IMMUNIZATION  Completed     AORTIC ANEURYSM SCREENING (SYSTEM ASSIGNED)  Completed     COVID-19 Vaccine  Completed     IPV IMMUNIZATION  Aged Out     MENINGITIS IMMUNIZATION  Aged Out     HEPATITIS B IMMUNIZATION  Aged Out         HEALTH CARE MAINTENENCE: The recommended screening tests and vaccinatons for this patient have been discussed as above.  The appropriate tests and vaccinations  have been ordered or declined by the patient. Please see the orders in EPIC.The patient specifically declines: n/a     Immunization Status:  up to date and documented    Patient Active Problem List   Diagnosis     Apnea spell     Arthrodesis status     Personal history of other diseases of digestive system     Contact dermatitis and eczema     Elevated PSA     Jaundice, non-     Hypercholesterolemia     Physical exam     Disorder of bursae and tendons in shoulder region     Screening for diabetes mellitus     Routine general medical examination at a health care facility     Diastasis recti     Elevated bilirubin     Personal history of smoking        ATP III Guidelines  ICSI Preventive Guidelines    PLAN:   Influenza  vaccine recommended in the fall  Discussed calcium intake, vitamins and supplements. Recommended 1000 mg of calcium daily  Sunscreen use was recommended especially in the area of tatoos  Refills on chronic medication given  Recommended dental exams every 6 months  Recommended eye exam every 1-2 years  Follow up in 1 year for the next preventative medical visit    The patients chronic medication was filled for 12  months.    Body mass index is 27.22 kg/m .

## 2021-08-31 ENCOUNTER — TRANSFERRED RECORDS (OUTPATIENT)
Dept: FAMILY MEDICINE | Facility: CLINIC | Age: 66
End: 2021-08-31

## 2021-08-31 ENCOUNTER — OFFICE VISIT (OUTPATIENT)
Dept: FAMILY MEDICINE | Facility: CLINIC | Age: 66
End: 2021-08-31
Payer: COMMERCIAL

## 2021-08-31 VITALS
SYSTOLIC BLOOD PRESSURE: 138 MMHG | DIASTOLIC BLOOD PRESSURE: 62 MMHG | HEART RATE: 65 BPM | OXYGEN SATURATION: 98 % | BODY MASS INDEX: 27.22 KG/M2 | TEMPERATURE: 97.7 F | WEIGHT: 179 LBS

## 2021-08-31 DIAGNOSIS — E78.00 HYPERCHOLESTEROLEMIA: ICD-10-CM

## 2021-08-31 DIAGNOSIS — Z00.00 ENCOUNTER FOR MEDICARE ANNUAL WELLNESS EXAM: Primary | ICD-10-CM

## 2021-08-31 DIAGNOSIS — H91.90 HEARING DIFFICULTY, UNSPECIFIED LATERALITY: ICD-10-CM

## 2021-08-31 PROCEDURE — 99397 PER PM REEVAL EST PAT 65+ YR: CPT | Performed by: FAMILY MEDICINE

## 2021-08-31 RX ORDER — SIMVASTATIN 10 MG
10 TABLET ORAL AT BEDTIME
Qty: 90 TABLET | Refills: 3 | Status: SHIPPED | OUTPATIENT
Start: 2021-08-31 | End: 2022-09-07

## 2021-08-31 RX ORDER — ERGOCALCIFEROL (VITAMIN D2) 10 MCG
TABLET ORAL
COMMUNITY

## 2021-08-31 ASSESSMENT — PAIN SCALES - GENERAL: PAINLEVEL: NO PAIN (0)

## 2021-08-31 NOTE — NURSING NOTE
"Chief Complaint   Patient presents with     Medicare Visit       Initial BP (!) 157/70   Pulse (!) 49   Temp 97.7  F (36.5  C) (Tympanic)   Wt 81.2 kg (179 lb)   SpO2 98%   BMI 27.22 kg/m   Estimated body mass index is 27.22 kg/m  as calculated from the following:    Height as of 1/26/21: 1.727 m (5' 8\").    Weight as of this encounter: 81.2 kg (179 lb).  Medication Reconciliation: complete  Leanne Stuart, [facility]  "

## 2021-10-09 ENCOUNTER — HEALTH MAINTENANCE LETTER (OUTPATIENT)
Age: 66
End: 2021-10-09

## 2022-01-26 ENCOUNTER — TELEPHONE (OUTPATIENT)
Dept: FAMILY MEDICINE | Facility: CLINIC | Age: 67
End: 2022-01-26
Payer: COMMERCIAL

## 2022-01-26 DIAGNOSIS — G47.33 OSA (OBSTRUCTIVE SLEEP APNEA): Primary | ICD-10-CM

## 2022-01-26 NOTE — TELEPHONE ENCOUNTER
Patient calling to request an order for a: New Nose piece for his C-Pap machine. He's been having problems with it and patient is leaving out of town this Sunday 1/30/22 and needs it before then.     Patient wants faxed to [facility] [phone]  [facility] has one ready just needs an order for it.    Please call patient when order has been completed at: [phone] can leave a detailed message    Monique Latif

## 2022-01-27 ENCOUNTER — MEDICAL CORRESPONDENCE (OUTPATIENT)
Dept: HEALTH INFORMATION MANAGEMENT | Facility: CLINIC | Age: 67
End: 2022-01-27
Payer: COMMERCIAL

## 2022-01-27 NOTE — TELEPHONE ENCOUNTER
Faxed Orders to [facility] at [phone] and called patient said that orders were written and faxed.    Monique Latif

## 2022-07-14 ENCOUNTER — TRANSFERRED RECORDS (OUTPATIENT)
Dept: FAMILY MEDICINE | Facility: CLINIC | Age: 67
End: 2022-07-14

## 2022-08-09 ENCOUNTER — TRANSFERRED RECORDS (OUTPATIENT)
Dept: FAMILY MEDICINE | Facility: CLINIC | Age: 67
End: 2022-08-09

## 2022-08-24 ENCOUNTER — DOCUMENTATION ONLY (OUTPATIENT)
Dept: LAB | Facility: CLINIC | Age: 67
End: 2022-08-24

## 2022-08-24 DIAGNOSIS — E78.00 HYPERCHOLESTEROLEMIA: Primary | ICD-10-CM

## 2022-08-24 DIAGNOSIS — Z12.5 SCREENING PSA (PROSTATE SPECIFIC ANTIGEN): ICD-10-CM

## 2022-08-24 NOTE — PROGRESS NOTES
Chinedu Houser has an upcoming lab appointment:    Future Appointments   Date Time Provider Department Center   9/1/2022  7:15 AM [facility]   9/2/2022  7:30 AM [facility]         There is no order available. Please review and place either future orders or HMPO (Review of Health Maintenance Protocol Orders), as appropriate.    Health Maintenance Due   Topic     ANNUAL REVIEW OF HM ORDERS      Darlene Schmitt

## 2022-09-01 ENCOUNTER — LAB (OUTPATIENT)
Dept: LAB | Facility: CLINIC | Age: 67
End: 2022-09-01
Payer: COMMERCIAL

## 2022-09-01 DIAGNOSIS — Z12.5 SCREENING PSA (PROSTATE SPECIFIC ANTIGEN): ICD-10-CM

## 2022-09-01 DIAGNOSIS — E78.00 HYPERCHOLESTEROLEMIA: ICD-10-CM

## 2022-09-01 LAB
ALBUMIN SERPL-MCNC: 3.9 G/DL (ref 3.4–5)
ALP SERPL-CCNC: 51 U/L (ref 40–150)
ALT SERPL W P-5'-P-CCNC: 28 U/L (ref 0–70)
ANION GAP SERPL CALCULATED.3IONS-SCNC: 4 MMOL/L (ref 3–14)
AST SERPL W P-5'-P-CCNC: 27 U/L (ref 0–45)
BILIRUB SERPL-MCNC: 1.2 MG/DL (ref 0.2–1.3)
BUN SERPL-MCNC: 8 MG/DL (ref 7–30)
CALCIUM SERPL-MCNC: 9.2 MG/DL (ref 8.5–10.1)
CHLORIDE BLD-SCNC: 104 MMOL/L (ref 94–109)
CHOLEST SERPL-MCNC: 133 MG/DL
CO2 SERPL-SCNC: 29 MMOL/L (ref 20–32)
CREAT SERPL-MCNC: 0.75 MG/DL (ref 0.66–1.25)
FASTING STATUS PATIENT QL REPORTED: YES
GFR SERPL CREATININE-BSD FRML MDRD: >90 ML/MIN/1.73M2
GLUCOSE BLD-MCNC: 96 MG/DL (ref 70–99)
HDLC SERPL-MCNC: 54 MG/DL
LDLC SERPL CALC-MCNC: 69 MG/DL
NONHDLC SERPL-MCNC: 79 MG/DL
POTASSIUM BLD-SCNC: 4.8 MMOL/L (ref 3.4–5.3)
PROT SERPL-MCNC: 7 G/DL (ref 6.8–8.8)
PSA SERPL-MCNC: 0.92 UG/L (ref 0–4)
SODIUM SERPL-SCNC: 137 MMOL/L (ref 133–144)
TRIGL SERPL-MCNC: 51 MG/DL

## 2022-09-01 PROCEDURE — G0103 PSA SCREENING: HCPCS

## 2022-09-01 PROCEDURE — 80053 COMPREHEN METABOLIC PANEL: CPT

## 2022-09-01 PROCEDURE — 80061 LIPID PANEL: CPT

## 2022-09-01 PROCEDURE — 36415 COLL VENOUS BLD VENIPUNCTURE: CPT

## 2022-09-06 DIAGNOSIS — E78.00 HYPERCHOLESTEROLEMIA: ICD-10-CM

## 2022-09-07 RX ORDER — SIMVASTATIN 10 MG
TABLET ORAL
Qty: 90 TABLET | Refills: 0 | Status: SHIPPED | OUTPATIENT
Start: 2022-09-07 | End: 2022-10-17

## 2022-09-17 ENCOUNTER — HEALTH MAINTENANCE LETTER (OUTPATIENT)
Age: 67
End: 2022-09-17

## 2022-10-14 NOTE — PROGRESS NOTES
"SUBJECTIVE:   Arnoldo is a 67 year old who presents for Preventive Visit.  History high cholesterol (elevated blood pressure too in past)  Lots of walking. Needs some weight lifting. Some neck issues. Did  P.T.  No chest pain or shortness of breath. Outside blood pressure normal.  [place] - normal colonoscopy - due in at 69yo.  No nausea, vomiting or diarrhea or black/bloody stools. No urine changes or  Hematuria.   2 pots coffee/day. Needs more water. No pop. No ALCOHOL. No rashes/mole changes.   Taking vitaminD. Drinks milk. Eye exam -recently. Dentist regularly.   No testicle pain/masses/hernia.   emotoinally doing ok.  and 4 kids. 7 grandkids. cpap for harry.     Patient has been advised of split billing requirements and indicates understanding: Yes  Are you in the first 12 months of your Medicare coverage?  No    Healthy Habits:     In general, how would you rate your overall health?  Good    Frequency of exercise:  2-3 days/week    Duration of exercise:  30-45 minutes    Do you usually eat at least 4 servings of fruit and vegetables a day, include whole grains    & fiber and avoid regularly eating high fat or \"junk\" foods?  Yes    Taking medications regularly:  Yes    Medication side effects:  None    Ability to successfully perform activities of daily living:  No assistance needed    Home Safety:  No safety concerns identified    Hearing Impairment:  Need to ask people to speak up or repeat themselves    In the past 6 months, have you been bothered by leaking of urine?  No    In general, how would you rate your overall mental or emotional health?  Good      PHQ-2 Total Score: 0    Additional concerns today:  No    Do you feel safe in your environment? Yes    Have you ever done Advance Care Planning? (For example, a Health Directive, POLST, or a discussion with a medical provider or your loved ones about your wishes): Yes, advance care planning is on file.    Normal conversational hearing.  Fall " risk  Normal falls    Cognitive Screening : normal screening    Do you have sleep apnea, excessive snoring or daytime drowsiness?: yes    Reviewed and updated as needed this visit by clinical staff   Tobacco  Allergies  Meds   Med Hx  Surg Hx  Fam Hx  Soc Hx        Reviewed and updated as needed this visit by Provider                 Social History     Tobacco Use     Smoking status: Former     Packs/day: 0.00     Years: 5.00     Pack years: 0.00     Types: Cigarettes     Start date: 6/15/1972     Quit date: 1985     Years since quittin.8     Smokeless tobacco: Never   Substance Use Topics     Alcohol use: No         Alcohol Use 10/15/2022   Prescreen: >3 drinks/day or >7 drinks/week? Not Applicable       Patient Care Team:  Piyush Pickett MD as PCP - General (Family Medicine)  Charles Skinner MD (Inactive) as Assigned PCP    The following health maintenance items are reviewed in Epic and correct as of today:  Health Maintenance   Topic Date Due     ANNUAL REVIEW OF  ORDERS  Never done     HEPATITIS B IMMUNIZATION (1 of 3 - 3-dose series) Never done     COLORECTAL CANCER SCREENING  Never done     LUNG CANCER SCREENING  Never done     MEDICARE ANNUAL WELLNESS VISIT  2022     FALL RISK ASSESSMENT  10/17/2023     DTAP/TDAP/TD IMMUNIZATION (2 - Tdap) 2023     ADVANCE CARE PLANNING  2026     LIPID  [id number]     HEPATITIS C SCREENING  Completed     PHQ-2 (once per calendar year)  Completed     INFLUENZA VACCINE  Completed     Pneumococcal Vaccine: 65+ Years  Completed     ZOSTER IMMUNIZATION  Completed     AORTIC ANEURYSM SCREENING (SYSTEM ASSIGNED)  Completed     COVID-19 Vaccine  Completed     IPV IMMUNIZATION  Aged Out     MENINGITIS IMMUNIZATION  Aged Out     Lab work is in process      Review of Systems   Constitutional: Negative for chills and fever.   HENT: Negative for congestion, ear pain, hearing loss and sore throat.    Eyes: Positive for visual disturbance. Negative  "for pain.   Respiratory: Negative for cough and shortness of breath.    Cardiovascular: Negative for chest pain, palpitations and peripheral edema.   Gastrointestinal: Negative for abdominal pain, constipation, diarrhea, heartburn, hematochezia and nausea.   Genitourinary: Negative for dysuria, frequency, genital sores, hematuria, impotence, penile discharge and urgency.   Musculoskeletal: Negative for arthralgias, joint swelling and myalgias.   Skin: Negative for rash.   Neurological: Positive for headaches. Negative for dizziness, weakness and paresthesias.   Psychiatric/Behavioral: Negative for mood changes. The patient is not nervous/anxious.          OBJECTIVE:   Physical Exam   /78   Pulse 52   Temp (!) 96.6  F (35.9  C) (Tympanic)   Ht 1.753 m (5' 9\")   Wt 84.4 kg (186 lb)   SpO2 99%   BMI 27.47 kg/m     GENERAL: healthy, alert and no distress  EYES: Eyes grossly normal to inspection, PERRL and conjunctivae and sclerae normal  HENT: ear canals and TM's normal, nose and mouth without ulcers or lesions  NECK: no adenopathy, no asymmetry, masses, or scars and thyroid normal to palpation  RESP: lungs clear to auscultation - no rales, rhonchi or wheezes  BREAST: normal without masses, tenderness or nipple discharge and no palpable axillary masses or adenopathy  CV: regular rate and rhythm, normal S1 S2, no S3 or S4, no murmur, click or rub, no peripheral edema and peripheral pulses strong  ABDOMEN: soft, nontender, no hepatosplenomegaly, no masses and bowel sounds normal   (male): patient deferred /rectal exams  MS: no gross musculoskeletal defects noted, no edema  SKIN: no suspicious lesions or rashes  NEURO: Normal strength and tone, mentation intact and speech normal  PSYCH: mentation appears normal, affect normal/bright  LYMPH: no cervical, supraclavicular, axillary, or inguinal adenopathy      ASSESSMENT / PLAN:  (Z00.00) Encounter for Medicare annual wellness exam  (primary encounter " "diagnosis)  Comment: generally healtlhy and normal exam. No falls and memory ok  Plan: Reviewed self mole/testicle check handout.  Continue vitmaind and exercise. Add some weight lifting. Call/email with questions/concerns     (Z12.11) Screen for colon cancer  Plan: due next year. RAY done of old colonoscopy    (E78.00) Hypercholesterolemia  Comment: stable  Plan: simvastatin (ZOCOR) 10 MG tablet        Continue self-monitor blood pressure and limit sodium. Start norvasc if blood pressure worse. Chest pain or shortness of breath to er.        Patient has been advised of split billing requirements and indicates understanding: Yes    COUNSELING:  Reviewed preventive health counseling, as reflected in patient instructions       Regular exercise       Healthy diet/nutrition       Vision screening       Hearing screening       Dental care       Bladder control       Fall risk prevention       Aspirin prophylaxis        Colon cancer screening       Prostate cancer screening    Estimated body mass index is 27.47 kg/m  as calculated from the following:    Height as of this encounter: 1.753 m (5' 9\").    Weight as of this encounter: 84.4 kg (186 lb).    Weight management plan: Discussed healthy diet and exercise guidelines    He reports that he quit smoking about 37 years ago. His smoking use included cigarettes. He started smoking about 50 years ago. He has never used smokeless tobacco.      Appropriate preventive services were discussed with this patient, including applicable screening as appropriate for cardiovascular disease, diabetes, osteopenia/osteoporosis, and glaucoma.  As appropriate for age/gender, discussed screening for colorectal cancer, prostate cancer, breast cancer, and cervical cancer. Checklist reviewing preventive services available has been given to the patient.    Reviewed patients plan of care and provided an AVS. The Intermediate Care Plan ( asthma action plan, low back pain action plan, and migraine " action plan) for Chinedu meets the Care Plan requirement. This Care Plan has been established and reviewed with the Patient.    Counseling Resources:  ATP IV Guidelines  Pooled Cohorts Equation Calculator  Breast Cancer Risk Calculator  Breast Cancer: Medication to Reduce Risk  FRAX Risk Assessment  ICSI Preventive Guidelines  Dietary Guidelines for Americans, 2010  USDA's MyPlate  ASA Prophylaxis  Lung CA Screening    Piyush Pickett MD  [facility]    Identified Health Risks:

## 2022-10-14 NOTE — PATIENT INSTRUCTIONS
Patient Education   Personalized Prevention Plan  You are due for the preventive services outlined below.  Your care team is available to assist you in scheduling these services.  If you have already completed any of these items, please share that information with your care team to update in your medical record.  Health Maintenance Due   Topic Date Due     ANNUAL REVIEW OF HM ORDERS  Never done     Hepatitis B Vaccine (1 of 3 - 3-dose series) Never done     Colorectal Cancer Screening  Never done     LUNG CANCER SCREENING  Never done     COVID-19 Vaccine (5 - Booster for Pfizer series) 06/07/2022     Annual Wellness Visit  08/31/2022     Flu Vaccine (1) 09/01/2022

## 2022-10-15 ASSESSMENT — ENCOUNTER SYMPTOMS
COUGH: 0
HEMATOCHEZIA: 0
WEAKNESS: 0
FEVER: 0
CONSTIPATION: 0
DYSURIA: 0
HEADACHES: 1
NERVOUS/ANXIOUS: 0
HEMATURIA: 0
ARTHRALGIAS: 0
SORE THROAT: 0
PARESTHESIAS: 0
NAUSEA: 0
MYALGIAS: 0
PALPITATIONS: 0
HEARTBURN: 0
FREQUENCY: 0
SHORTNESS OF BREATH: 0
EYE PAIN: 0
ABDOMINAL PAIN: 0
DIARRHEA: 0
CHILLS: 0
JOINT SWELLING: 0
DIZZINESS: 0

## 2022-10-15 ASSESSMENT — ACTIVITIES OF DAILY LIVING (ADL): CURRENT_FUNCTION: NO ASSISTANCE NEEDED

## 2022-10-17 ENCOUNTER — OFFICE VISIT (OUTPATIENT)
Dept: FAMILY MEDICINE | Facility: CLINIC | Age: 67
End: 2022-10-17
Payer: COMMERCIAL

## 2022-10-17 VITALS
DIASTOLIC BLOOD PRESSURE: 78 MMHG | BODY MASS INDEX: 27.55 KG/M2 | HEIGHT: 69 IN | HEART RATE: 52 BPM | TEMPERATURE: 96.6 F | SYSTOLIC BLOOD PRESSURE: 138 MMHG | OXYGEN SATURATION: 99 % | WEIGHT: 186 LBS

## 2022-10-17 DIAGNOSIS — Z00.00 ENCOUNTER FOR MEDICARE ANNUAL WELLNESS EXAM: Primary | ICD-10-CM

## 2022-10-17 DIAGNOSIS — Z12.11 SCREEN FOR COLON CANCER: ICD-10-CM

## 2022-10-17 DIAGNOSIS — E78.00 HYPERCHOLESTEROLEMIA: ICD-10-CM

## 2022-10-17 PROCEDURE — G0438 PPPS, INITIAL VISIT: HCPCS | Performed by: FAMILY MEDICINE

## 2022-10-17 RX ORDER — SIMVASTATIN 10 MG
10 TABLET ORAL AT BEDTIME
Qty: 90 TABLET | Refills: 3 | Status: SHIPPED | OUTPATIENT
Start: 2022-10-17 | End: 2023-10-30

## 2022-10-17 ASSESSMENT — ENCOUNTER SYMPTOMS
JOINT SWELLING: 0
FEVER: 0
CHILLS: 0
FREQUENCY: 0
SHORTNESS OF BREATH: 0
NERVOUS/ANXIOUS: 0
PARESTHESIAS: 0
DIARRHEA: 0
HEMATURIA: 0
WEAKNESS: 0
CONSTIPATION: 0
HEARTBURN: 0
PALPITATIONS: 0
HEADACHES: 1
HEMATOCHEZIA: 0
COUGH: 0
ABDOMINAL PAIN: 0
ARTHRALGIAS: 0
EYE PAIN: 0
MYALGIAS: 0
SORE THROAT: 0
DYSURIA: 0
NAUSEA: 0
DIZZINESS: 0

## 2022-10-17 ASSESSMENT — PAIN SCALES - GENERAL: PAINLEVEL: NO PAIN (0)

## 2022-10-17 ASSESSMENT — ACTIVITIES OF DAILY LIVING (ADL): CURRENT_FUNCTION: NO ASSISTANCE NEEDED

## 2022-10-17 NOTE — NURSING NOTE
"Chief Complaint   Patient presents with     Wellness Visit       Initial BP (!) 175/82   Pulse 52   Temp (!) 96.6  F (35.9  C) (Tympanic)   Ht 1.753 m (5' 9\")   Wt 84.4 kg (186 lb)   SpO2 99%   BMI 27.47 kg/m   Estimated body mass index is 27.47 kg/m  as calculated from the following:    Height as of this encounter: 1.753 m (5' 9\").    Weight as of this encounter: 84.4 kg (186 lb).  Medication Reconciliation: complete    ALEXANDER Alonzo MA    "

## 2023-01-09 ENCOUNTER — VIRTUAL VISIT (OUTPATIENT)
Dept: FAMILY MEDICINE | Facility: CLINIC | Age: 68
End: 2023-01-09
Payer: COMMERCIAL

## 2023-01-09 DIAGNOSIS — U07.1 COVID-19 VIRUS INFECTION: Primary | ICD-10-CM

## 2023-01-09 PROCEDURE — 99213 OFFICE O/P EST LOW 20 MIN: CPT | Mod: 95 | Performed by: PHYSICIAN ASSISTANT

## 2023-01-09 NOTE — PROGRESS NOTES
Arnoldo is a 67 year old who is being evaluated via a billable video visit.      How would you like to obtain your AVS? MyChart  If the video visit is dropped, the invitation should be resent by: Text to cell phone: [phone]  Will anyone else be joining your video visit? No    Assessment & Plan   Problem List Items Addressed This Visit    None  Visit Diagnoses     COVID-19 virus infection    -  Primary    Relevant Medications    nirmatrelvir and ritonavir (PAXLOVID) therapy pack         Impression is COVID-19. Positive home test. Fully vaccinated and boosted x 3. Appears well and non-toxic and I have low suspicion for impending airway obstruction or respiratory distress at this point.  He will push p.o. fluids, use over-the-counter meds for symptoms, complete a course of Paxlovid after discussing risks/benefits, and follow-up with us in 2 weeks if not improving or urgent care/the ER if symptoms worsen/change at any time.    DDx and Dx discussed with and explained to the pt to their satisfaction.  All questions were answered at this time. Pt expressed understanding of and agreement with this dx, tx, and plan. No further workup warranted and standard medication warnings given. I have given the patient a list of pertinent indications for re-evaluation. Will go to the Emergency Department if symptoms worsen or new concerning symptoms arise. Patient left the call in no apparent distress.     Prescription drug management     See Patient Instructions  COVID-19 positive patient.  Encounter for consideration of medication intervention. Patient does qualify for a prescription. Full discussion with patient including medication options, risks and benefits. Potential drug interactions reviewed with patient.     Treatment Planned Paxlovid  Temporary change to home medications: Discontinue simvastatin for the next 10 days after beginning the Paxlovid.    Estimated body mass index is 27.47 kg/m  as calculated from the following:     "Height as of 10/17/22: 1.753 m (5' 9\").    Weight as of 10/17/22: 84.4 kg (186 lb).  GFR Estimate   Date Value Ref Range Status   09/01/2022 >90 >60 mL/min/1.73m2 Final     Comment:     Effective December 21, 2021 eGFRcr in adults is calculated using the 2021 CKD-EPI creatinine equation which includes age and gender (Lanny et al., NEJ, DOI: 10.1056/IQXFky7516918)   07/01/2020 >90 >60 mL/min/[1.73_m2] Final     Comment:     Non  GFR Calc  Starting 12/18/2018, serum creatinine based estimated GFR (eGFR) will be   calculated using the Chronic Kidney Disease Epidemiology Collaboration   (CKD-EPI) equation.       No results found for: [id number]    Return in about 2 weeks (around 1/23/2023) for a recheck of your symptoms if not improving, or call 911/go to an ER anytime if worsening.    PATEL Barnes  [facility] MICHELLE Mclaughlin is a 67 year old presenting for the following health issues:  Suspected Covid      HPI       COVID-19 Symptom Review  How many days ago did these symptoms start? 2 days    Are any of the following symptoms significant for you?    New or worsening difficulty breathing? No    Worsening cough? No    Fever or chills? No    Headache: YES    Sore throat: YES    Chest pain: No    Diarrhea: No    Body aches? No    What treatments has patient tried? Ibu   Does patient live in a nursing home, group home, or shelter? No  Does patient have a way to get food/medications during quarantined? Yes    No history of previous COVID.    Review of Systems   Constitutional, HEENT, cardiovascular, pulmonary, gi and gu systems are negative, except as otherwise noted.      Objective           Vitals:  No vitals were obtained today due to virtual visit.    Physical Exam   GENERAL: Healthy, alert and no distress  EYES: Eyes grossly normal to inspection.  No discharge or erythema, or obvious scleral/conjunctival abnormalities.  RESP: No audible wheeze, cough, or visible " cyanosis.  No visible retractions or increased work of breathing.    SKIN: Visible skin clear. No significant rash, abnormal pigmentation or lesions.  NEURO: Cranial nerves grossly intact.  Mentation and speech appropriate for age.  PSYCH: Mentation appears normal, affect normal/bright, judgement and insight intact, normal speech and appearance well-groomed.      Video-Visit Details    Type of service:  Video Visit   Video Start Time: 11:55 AM  Video End Time:12:02 PM    Originating Location (pt. Location): Home  Distant Location (provider location):  On-site  Platform used for Video Visit: Jodie

## 2023-01-09 NOTE — PATIENT INSTRUCTIONS
Emily Mclaughlin,    Thank you for allowing [facility] to manage your care.    Discontinue simvastatin for the next 10 days after beginning the Paxlovid.    If you develop worsening/changing symptoms at any time, please call 911 or go to the emergency department for evaluation.    I sent your prescriptions to your pharmacy.    Drink 8-10 glasses of fluid daily to stay well-hydrated.    For your pain, please use Tylenol 650mg every 6 hours. You may use 400mg of ibuprofen between doses of Tylenol.     Max acetaminophen (Tylenol) 3,000mg/24 hours  Max ibuprofen 2,000mg/24 hours    If you have any questions or concerns, please feel free to call us at [phone]    Sincerely,    Richie Sherwood PA-C    Did you know?      You can schedule a video visit for follow-up appointments as well as future appointments for certain conditions.  Please see the below link.     [email or web address]    If you have not already done so,  I encourage you to sign up for [employer] ([email or web address]).  This will allow you to review your results, securely communicate with a provider, and schedule virtual visits as well.

## 2023-10-10 ENCOUNTER — DOCUMENTATION ONLY (OUTPATIENT)
Dept: FAMILY MEDICINE | Facility: CLINIC | Age: 68
End: 2023-10-10
Payer: COMMERCIAL

## 2023-10-10 DIAGNOSIS — Z12.5 SCREENING PSA (PROSTATE SPECIFIC ANTIGEN): Primary | ICD-10-CM

## 2023-10-10 DIAGNOSIS — Z00.00 PREVENTATIVE HEALTH CARE: ICD-10-CM

## 2023-10-10 DIAGNOSIS — Z13.6 CARDIOVASCULAR SCREENING; LDL GOAL LESS THAN 130: ICD-10-CM

## 2023-10-10 NOTE — PROGRESS NOTES
Chinedu FRANCESCO Houser has an upcoming lab appointment for PVL    Future Appointments   Date Time Provider Department Center   10/26/2023  7:15 AM [facility]   10/30/2023 10:30 AM [facility]         There is no order available. Please review and place either future orders or HMPO (Review of Health Maintenance Protocol Orders), as appropriate.    Health Maintenance Due   Topic    ANNUAL REVIEW OF HM ORDERS      Darlene Schmitt

## 2023-10-26 ENCOUNTER — LAB (OUTPATIENT)
Dept: LAB | Facility: CLINIC | Age: 68
End: 2023-10-26
Payer: COMMERCIAL

## 2023-10-26 DIAGNOSIS — Z12.5 SCREENING PSA (PROSTATE SPECIFIC ANTIGEN): ICD-10-CM

## 2023-10-26 DIAGNOSIS — Z13.6 CARDIOVASCULAR SCREENING; LDL GOAL LESS THAN 130: ICD-10-CM

## 2023-10-26 DIAGNOSIS — Z00.00 PREVENTATIVE HEALTH CARE: ICD-10-CM

## 2023-10-26 LAB
ALBUMIN SERPL BCG-MCNC: 4.4 G/DL (ref 3.5–5.2)
ALP SERPL-CCNC: 59 U/L (ref 40–129)
ALT SERPL W P-5'-P-CCNC: 28 U/L (ref 0–70)
ANION GAP SERPL CALCULATED.3IONS-SCNC: 12 MMOL/L (ref 7–15)
AST SERPL W P-5'-P-CCNC: 34 U/L (ref 0–45)
BILIRUB SERPL-MCNC: 1.9 MG/DL
BUN SERPL-MCNC: 10.5 MG/DL (ref 8–23)
CALCIUM SERPL-MCNC: 9.5 MG/DL (ref 8.8–10.2)
CHLORIDE SERPL-SCNC: 103 MMOL/L (ref 98–107)
CHOLEST SERPL-MCNC: 163 MG/DL
CREAT SERPL-MCNC: 0.85 MG/DL (ref 0.67–1.17)
DEPRECATED HCO3 PLAS-SCNC: 23 MMOL/L (ref 22–29)
EGFRCR SERPLBLD CKD-EPI 2021: >90 ML/MIN/1.73M2
GLUCOSE SERPL-MCNC: 95 MG/DL (ref 70–99)
HDLC SERPL-MCNC: 54 MG/DL
LDLC SERPL CALC-MCNC: 94 MG/DL
NONHDLC SERPL-MCNC: 109 MG/DL
POTASSIUM SERPL-SCNC: 4 MMOL/L (ref 3.4–5.3)
PROT SERPL-MCNC: 7.4 G/DL (ref 6.4–8.3)
PSA SERPL DL<=0.01 NG/ML-MCNC: 0.83 NG/ML (ref 0–6.5)
SODIUM SERPL-SCNC: 138 MMOL/L (ref 135–145)
TRIGL SERPL-MCNC: 74 MG/DL

## 2023-10-26 PROCEDURE — 80061 LIPID PANEL: CPT

## 2023-10-26 PROCEDURE — 80053 COMPREHEN METABOLIC PANEL: CPT

## 2023-10-26 PROCEDURE — G0103 PSA SCREENING: HCPCS

## 2023-10-26 PROCEDURE — 36415 COLL VENOUS BLD VENIPUNCTURE: CPT

## 2023-10-27 RX ORDER — RESPIRATORY SYNCYTIAL VIRUS VACCINE 120MCG/0.5
0.5 KIT INTRAMUSCULAR ONCE
Qty: 1 EACH | Refills: 0 | Status: CANCELLED | OUTPATIENT
Start: 2023-10-27 | End: 2023-10-27

## 2023-10-27 ASSESSMENT — ENCOUNTER SYMPTOMS
CONSTIPATION: 0
ABDOMINAL PAIN: 0
MYALGIAS: 1
PALPITATIONS: 0
FREQUENCY: 0
EYE PAIN: 0
HEARTBURN: 0
FEVER: 0
NERVOUS/ANXIOUS: 0
CHILLS: 0
DYSURIA: 0
DIARRHEA: 0
NAUSEA: 0
WEAKNESS: 0
JOINT SWELLING: 0
COUGH: 0
HEMATURIA: 0
PARESTHESIAS: 0
HEADACHES: 0
SHORTNESS OF BREATH: 0
SORE THROAT: 0
DIZZINESS: 0
HEMATOCHEZIA: 0
ARTHRALGIAS: 0

## 2023-10-27 ASSESSMENT — ACTIVITIES OF DAILY LIVING (ADL): CURRENT_FUNCTION: NO ASSISTANCE NEEDED

## 2023-10-30 ENCOUNTER — OFFICE VISIT (OUTPATIENT)
Dept: FAMILY MEDICINE | Facility: CLINIC | Age: 68
End: 2023-10-30
Payer: COMMERCIAL

## 2023-10-30 VITALS
BODY MASS INDEX: 29.68 KG/M2 | WEIGHT: 200.4 LBS | HEIGHT: 69 IN | SYSTOLIC BLOOD PRESSURE: 142 MMHG | TEMPERATURE: 97.5 F | DIASTOLIC BLOOD PRESSURE: 78 MMHG | OXYGEN SATURATION: 96 % | RESPIRATION RATE: 14 BRPM | HEART RATE: 48 BPM

## 2023-10-30 DIAGNOSIS — R09.81 CONGESTION OF PARANASAL SINUS: ICD-10-CM

## 2023-10-30 DIAGNOSIS — R03.0 ELEVATED BLOOD PRESSURE READING WITHOUT DIAGNOSIS OF HYPERTENSION: ICD-10-CM

## 2023-10-30 DIAGNOSIS — Z12.11 COLON CANCER SCREENING: ICD-10-CM

## 2023-10-30 DIAGNOSIS — Z00.00 ENCOUNTER FOR MEDICARE ANNUAL WELLNESS EXAM: ICD-10-CM

## 2023-10-30 DIAGNOSIS — E78.00 HYPERCHOLESTEROLEMIA: ICD-10-CM

## 2023-10-30 DIAGNOSIS — Z00.00 ENCOUNTER FOR SUBSEQUENT ANNUAL WELLNESS VISIT IN MEDICARE PATIENT: Primary | ICD-10-CM

## 2023-10-30 PROCEDURE — 99213 OFFICE O/P EST LOW 20 MIN: CPT | Mod: 25 | Performed by: FAMILY MEDICINE

## 2023-10-30 PROCEDURE — G0439 PPPS, SUBSEQ VISIT: HCPCS | Performed by: FAMILY MEDICINE

## 2023-10-30 RX ORDER — SIMVASTATIN 10 MG
10 TABLET ORAL AT BEDTIME
Qty: 90 TABLET | Refills: 3 | Status: SHIPPED | OUTPATIENT
Start: 2023-10-30

## 2023-10-30 ASSESSMENT — ENCOUNTER SYMPTOMS
PALPITATIONS: 0
WEAKNESS: 0
CONSTIPATION: 0
SHORTNESS OF BREATH: 0
HEMATURIA: 0
COUGH: 0
ARTHRALGIAS: 0
DIARRHEA: 0
JOINT SWELLING: 0
EYE PAIN: 0
NAUSEA: 0
DIZZINESS: 0
FREQUENCY: 0
HEARTBURN: 0
HEADACHES: 0
FEVER: 0
PARESTHESIAS: 0
HEMATOCHEZIA: 0
DYSURIA: 0
ABDOMINAL PAIN: 0
MYALGIAS: 1
CHILLS: 0
SORE THROAT: 0
NERVOUS/ANXIOUS: 0

## 2023-10-30 ASSESSMENT — ACTIVITIES OF DAILY LIVING (ADL): CURRENT_FUNCTION: NO ASSISTANCE NEEDED

## 2023-10-30 ASSESSMENT — PAIN SCALES - GENERAL: PAINLEVEL: NO PAIN (1)

## 2023-10-30 NOTE — PATIENT INSTRUCTIONS
Patient Education   Personalized Prevention Plan  You are due for the preventive services outlined below.  Your care team is available to assist you in scheduling these services.  If you have already completed any of these items, please share that information with your care team to update in your medical record.  Health Maintenance Due   Topic Date Due     ANNUAL REVIEW OF  ORDERS  Never done     Colorectal Cancer Screening  Never done     RSV VACCINE 60+ (1 - 1-dose 60+ series) Never done     Hearing Loss: Care Instructions  Overview     Hearing loss is a sudden or slow decrease in how well you hear. It can range from slight to profound. Permanent hearing loss can occur with aging. It also can happen when you are exposed long-term to loud noise. Examples include listening to loud music, riding motorcycles, or being around other loud machines.  Hearing loss can affect your work and home life. It can make you feel lonely or depressed. You may feel that you have lost your independence. But hearing aids and other devices can help you hear better and feel connected to others.  Follow-up care is a key part of your treatment and safety. Be sure to make and go to all appointments, and call your doctor if you are having problems. It's also a good idea to know your test results and keep a list of the medicines you take.  How can you care for yourself at home?  Avoid loud noises whenever possible. This helps keep your hearing from getting worse.  Always wear hearing protection around loud noises.  Wear a hearing aid as directed.  A professional can help you pick a hearing aid that will work best for you.  You can also get hearing aids over the counter for mild to moderate hearing loss.  Have hearing tests as your doctor suggests. They can show whether your hearing has changed. Your hearing aid may need to be adjusted.  Use other devices as needed. These may include:  Telephone amplifiers and hearing aids that can connect to  "a television, stereo, radio, or microphone.  Devices that use lights or vibrations. These alert you to the doorbell, a ringing telephone, or a baby monitor.  Television closed-captioning. This shows the words at the bottom of the screen. Most new TVs can do this.  TTY (text telephone). This lets you type messages back and forth on the telephone instead of talking or listening. These devices are also called TDD. When messages are typed on the keyboard, they are sent over the phone line to a receiving TTY. The message is shown on a monitor.  Use text messaging, social media, and email if it is hard for you to communicate by telephone.  Try to learn a listening technique called speechreading. It is not lipreading. You pay attention to people's gestures, expressions, posture, and tone of voice. These clues can help you understand what a person is saying. Face the person you are talking to, and have them face you. Make sure the lighting is good. You need to see the other person's face clearly.  Think about counseling if you need help to adjust to your hearing loss.  When should you call for help?  Watch closely for changes in your health, and be sure to contact your doctor if:    You think your hearing is getting worse.     You have new symptoms, such as dizziness or nausea.   Where can you learn more?  Go to https://www.leemail.net/patiented  Enter R798 in the search box to learn more about \"Hearing Loss: Care Instructions.\"  Current as of: March 1, 2023               Content Version: 13.7    2819-5385 [employer].   Care instructions adapted under license by your healthcare professional. If you have questions about a medical condition or this instruction, always ask your healthcare professional. [employer], [employer] disclaims any warranty or liability for your use of this information.         "

## 2023-10-31 ENCOUNTER — TELEPHONE (OUTPATIENT)
Dept: GASTROENTEROLOGY | Facility: CLINIC | Age: 68
End: 2023-10-31
Payer: COMMERCIAL

## 2023-10-31 NOTE — TELEPHONE ENCOUNTER
"Endoscopy Scheduling Screen    Have you had a positive Covid test in the last 14 days?  No    Are you active on MyChart?   Yes    What insurance is in the chart?  Other:  [facility]    Ordering/Referring Provider:   KAYLA NAPOLES      (If ordering provider performs procedure, schedule with ordering provider unless otherwise instructed. )    BMI: Estimated body mass index is 29.59 kg/m  as calculated from the following:    Height as of 10/30/23: 1.753 m (5' 9\").    Weight as of 10/30/23: 90.9 kg (200 lb 6.4 oz).     Sedation Ordered  moderate sedation.   If patient BMI > 50 do not schedule in [facility].    If patient BMI > 45 do not schedule at [facility].    Are you taking methadone or Suboxone?  No    Are you taking any prescription medications for pain 3 or more times per week?   No    Do you have a history of malignant hyperthermia or adverse reaction to anesthesia?  No     Have you been diagnosed or told you have pulmonary hypertension?   No    Do you have an LVAD?  No    Have you been told you have moderate to severe sleep apnea?  No    Have you been told you have COPD, asthma, or any other lung disease?  No    Do you have any heart conditions?  No     Have you ever had an organ transplant?   No    Have you ever had or are you awaiting a heart or lung transplant?   No    Have you had a stroke or transient ischemic attack (TIA aka \"mini stroke\" in the last 6 months?   No    Have you been diagnosed with or been told you have cirrhosis of the liver?   No    Are you currently on dialysis?   No    Do you need assistance transferring?   No    BMI: Estimated body mass index is 29.59 kg/m  as calculated from the following:    Height as of 10/30/23: 1.753 m (5' 9\").    Weight as of 10/30/23: 90.9 kg (200 lb 6.4 oz).     Is patients BMI > 40 and scheduling location [facility]?  No    Do you take an injectable medication for weight loss or diabetes (excluding insulin)?  No    Do you take the medication Naltrexone?  No    Do you take blood " thinners?  No       Prep   Are you currently on dialysis or do you have chronic kidney disease?  No    Do you have a diagnosis of diabetes?  No    Do you have a diagnosis of cystic fibrosis (CF)?  No    On a regular basis do you go 3 -5 days between bowel movements?  No    BMI > 40?  No    Preferred Pharmacy:    [facility]/pharmacy #[id number] - [place], [place] - [address]  [address] [address]  Phone: [phone] Fax: [phone]      Final Scheduling Details   Colonoscopy prep sent?  Standard MiraLAX    Procedure scheduled  Colonoscopy    Surgeon:  ARLETTE     Date of procedure:  01/10/2024     Pre-OP / PAC:   No - Not required for this site.    Location  [facility] - [facility] - Patient preference.    Sedation   Moderate Sedation - Per order.      Patient Reminders:   You will receive a call from a Nurse to review instructions and health history.  This assessment must be completed prior to your procedure.  Failure to complete the Nurse assessment may result in the procedure being cancelled.      On the day of your procedure, please designate an adult(s) who can drive you home stay with you for the next 24 hours. The medicines used in the exam will make you sleepy. You will not be able to drive.      You cannot take public transportation, ride share services, or non-medical taxi service without a responsible caregiver.  Medical transport services are allowed with the requirement that a responsible caregiver will receive you at your destination.  We require that drivers and caregivers are confirmed prior to your procedure.

## 2023-11-01 ENCOUNTER — TELEPHONE (OUTPATIENT)
Dept: GASTROENTEROLOGY | Facility: CLINIC | Age: 68
End: 2023-11-01
Payer: COMMERCIAL

## 2023-11-01 NOTE — TELEPHONE ENCOUNTER
Caller: Chinedu Houser    Reason for Reschedule/Cancellation (please be detailed, any staff messages or encounters to note?): PT UNAVAILABLE      Prior to reschedule please review:  Ordering Provider: DONY  Sedation per order: MODERATE  Does patient have any ASC Exclusions, please identify?: N      Notes on Cancelled Procedure:  Procedure: Lower Endoscopy [Colonoscopy]   Date: 01/10/2024  Location: [facility]; [address], [place] [address]  Surgeon: ARLETTE      Rescheduled: Yes  Procedure: Lower Endoscopy [Colonoscopy]   Date: 01/24/2024  Location: [facility]; [address], [place] [id number]  Surgeon: NOLA   Sedation Level Scheduled  MODERATE,  Reason for Sedation Level PER ORDER   Prep/Instructions updated and sent: VIA FAB BAG       Send In - basket message to Panc - Amos Pool if EUS  procedure is canceled or rescheduled: [ N/A, YES or NO] NA

## 2023-11-15 ENCOUNTER — TELEPHONE (OUTPATIENT)
Dept: SURGERY | Facility: OTHER | Age: 68
End: 2023-11-15
Payer: COMMERCIAL

## 2023-11-15 NOTE — TELEPHONE ENCOUNTER
Patient called and requested that he be removed from the reminder list for appointments.  He states he has lived in the [facility] for 5 years now, and will not be receiving his care here at [facility].

## 2024-01-09 ENCOUNTER — TELEPHONE (OUTPATIENT)
Dept: GASTROENTEROLOGY | Facility: CLINIC | Age: 69
End: 2024-01-09
Payer: COMMERCIAL

## 2024-01-09 NOTE — TELEPHONE ENCOUNTER
Message sent to anesthesia d/t moderate WIN      Pre visit planning completed.      Procedure details:    Patient scheduled for Colonoscopy  on 01.24.2024.     Arrival time: 0730. Procedure time 0815    Pre op exam needed? N/A    Facility location: [facility]; [address], [place] [address]    Sedation type: Conscious sedation     Indication for procedure: Colon cancer screening       Chart review:     Electronic implanted devices? No    Recent diagnosis of diverticulitis within the last 6 weeks? No    Diabetic? No    Diabetic medication HOLDING recommendations: (if applicable)  Oral diabetic medications: N/A  Diabetic injectables: N/A  Insulin: N/A      Medication review:    Anticoagulants? No    NSAIDS? No NSAID medications per patient's medication list.  RN will verify with pre-assessment call.    Other medication HOLDING recommendations:  N/A      Prep for procedure:     Bowel prep recommendation: Standard Miralax   Due to:  standard bowel prep.    Prep instructions need to be sent via johanna Bran RN  Endoscopy Procedure Pre Assessment RN  [phone] option 4

## 2024-01-10 NOTE — TELEPHONE ENCOUNTER
Per Dr. Roblero approved for Rowan.    Attempted to contact patient in order to complete pre assessment questions.     No answer. Left message to return call to [phone] option 4      Monique Bran RN  Endoscopy Procedure Pre Assessment RN

## 2024-01-10 NOTE — TELEPHONE ENCOUNTER
Pre assessment completed for upcoming procedure.   (Please see previous telephone encounter notes for complete details)    Patient  returned call.       Procedure details:    Arrival time and facility location reviewed.    Pre op exam needed? N/A    Designated  policy reviewed. Instructed to have someone stay 6 hours post procedure.     COVID policy reviewed.      Medication review:    Medications reviewed. Please see supporting documentation below. Holding recommendations discussed (if applicable).       Prep for procedure:     Procedure prep instructions reviewed.        Additional information needed?  N/A      Patient  verbalized understanding and had no questions or concerns at this time.      Maddy Alvarez RN  Endoscopy Procedure Pre Assessment RN  [phone] option 4

## 2024-01-23 RX ORDER — FLUMAZENIL 0.1 MG/ML
0.2 INJECTION, SOLUTION INTRAVENOUS
Status: CANCELLED | OUTPATIENT
Start: 2024-01-23 | End: 2024-01-24

## 2024-01-23 RX ORDER — PROCHLORPERAZINE MALEATE 5 MG
5 TABLET ORAL EVERY 6 HOURS PRN
Status: CANCELLED | OUTPATIENT
Start: 2024-01-23

## 2024-01-23 RX ORDER — NALOXONE HYDROCHLORIDE 0.4 MG/ML
0.2 INJECTION, SOLUTION INTRAMUSCULAR; INTRAVENOUS; SUBCUTANEOUS
Status: CANCELLED | OUTPATIENT
Start: 2024-01-23

## 2024-01-23 RX ORDER — NALOXONE HYDROCHLORIDE 0.4 MG/ML
0.4 INJECTION, SOLUTION INTRAMUSCULAR; INTRAVENOUS; SUBCUTANEOUS
Status: CANCELLED | OUTPATIENT
Start: 2024-01-23

## 2024-01-23 RX ORDER — ONDANSETRON 2 MG/ML
4 INJECTION INTRAMUSCULAR; INTRAVENOUS EVERY 6 HOURS PRN
Status: CANCELLED | OUTPATIENT
Start: 2024-01-23

## 2024-01-23 RX ORDER — ONDANSETRON 4 MG/1
4 TABLET, ORALLY DISINTEGRATING ORAL EVERY 6 HOURS PRN
Status: CANCELLED | OUTPATIENT
Start: 2024-01-23

## 2024-01-24 ENCOUNTER — ANESTHESIA (OUTPATIENT)
Dept: SURGERY | Facility: AMBULATORY SURGERY CENTER | Age: 69
End: 2024-01-24
Payer: COMMERCIAL

## 2024-01-24 ENCOUNTER — HOSPITAL ENCOUNTER (OUTPATIENT)
Facility: AMBULATORY SURGERY CENTER | Age: 69
Discharge: HOME | End: 2024-01-24
Attending: INTERNAL MEDICINE
Payer: COMMERCIAL

## 2024-01-24 ENCOUNTER — ANESTHESIA EVENT (OUTPATIENT)
Dept: SURGERY | Facility: AMBULATORY SURGERY CENTER | Age: 69
End: 2024-01-24
Payer: COMMERCIAL

## 2024-01-24 VITALS
RESPIRATION RATE: 16 BRPM | SYSTOLIC BLOOD PRESSURE: 111 MMHG | DIASTOLIC BLOOD PRESSURE: 68 MMHG | OXYGEN SATURATION: 97 % | TEMPERATURE: 97.5 F | HEART RATE: 73 BPM

## 2024-01-24 VITALS — HEART RATE: 71 BPM

## 2024-01-24 LAB — COLONOSCOPY: NORMAL

## 2024-01-24 PROCEDURE — G0121 COLON CA SCRN NOT HI RSK IND: HCPCS

## 2024-01-24 PROCEDURE — G8918 PT W/O PREOP ORDER IV AB PRO: HCPCS

## 2024-01-24 PROCEDURE — G8907 PT DOC NO EVENTS ON DISCHARG: HCPCS

## 2024-01-24 RX ORDER — ONDANSETRON 2 MG/ML
4 INJECTION INTRAMUSCULAR; INTRAVENOUS
Status: DISCONTINUED | OUTPATIENT
Start: 2024-01-24 | End: 2024-01-25 | Stop reason: HOSPADM

## 2024-01-24 RX ORDER — LIDOCAINE HYDROCHLORIDE 20 MG/ML
INJECTION, SOLUTION INFILTRATION; PERINEURAL PRN
Status: DISCONTINUED | OUTPATIENT
Start: 2024-01-24 | End: 2024-01-24

## 2024-01-24 RX ORDER — LIDOCAINE 40 MG/G
CREAM TOPICAL
Status: DISCONTINUED | OUTPATIENT
Start: 2024-01-24 | End: 2024-01-25 | Stop reason: HOSPADM

## 2024-01-24 RX ORDER — PROPOFOL 10 MG/ML
INJECTION, EMULSION INTRAVENOUS PRN
Status: DISCONTINUED | OUTPATIENT
Start: 2024-01-24 | End: 2024-01-24

## 2024-01-24 RX ORDER — PROPOFOL 10 MG/ML
INJECTION, EMULSION INTRAVENOUS CONTINUOUS PRN
Status: DISCONTINUED | OUTPATIENT
Start: 2024-01-24 | End: 2024-01-24

## 2024-01-24 RX ORDER — SODIUM CHLORIDE, SODIUM LACTATE, POTASSIUM CHLORIDE, CALCIUM CHLORIDE 600; 310; 30; 20 MG/100ML; MG/100ML; MG/100ML; MG/100ML
INJECTION, SOLUTION INTRAVENOUS CONTINUOUS
Status: DISCONTINUED | OUTPATIENT
Start: 2024-01-24 | End: 2024-01-25 | Stop reason: HOSPADM

## 2024-01-24 RX ADMIN — PROPOFOL 150 MCG/KG/MIN: 10 INJECTION, EMULSION INTRAVENOUS at 08:10

## 2024-01-24 RX ADMIN — PROPOFOL 100 MG: 10 INJECTION, EMULSION INTRAVENOUS at 08:10

## 2024-01-24 RX ADMIN — LIDOCAINE HYDROCHLORIDE 60 MG: 20 INJECTION, SOLUTION INFILTRATION; PERINEURAL at 08:09

## 2024-01-24 RX ADMIN — SODIUM CHLORIDE, SODIUM LACTATE, POTASSIUM CHLORIDE, CALCIUM CHLORIDE: 600; 310; 30; 20 INJECTION, SOLUTION INTRAVENOUS at 08:02

## 2024-01-24 NOTE — ANESTHESIA CARE TRANSFER NOTE
Patient: Chinedu Houser    Procedure: Procedure(s):  Colonoscopy with CO2 insufflation       Diagnosis: Colon cancer screening [Z12.11]  Diagnosis Additional Information: No value filed.    Anesthesia Type:   MAC     Note:    Oropharynx: oropharynx clear of all foreign objects  Level of Consciousness: drowsy  Oxygen Supplementation: room air    Independent Airway: airway patency satisfactory and stable  Dentition: dentition unchanged  Vital Signs Stable: post-procedure vital signs reviewed and stable  Report to RN Given: handoff report given  Patient transferred to: Phase II  Comments: To Phase II. Report to RN.  VSS Resp status stable.  Handoff Report: Identifed the Patient, Identified the Reponsible Provider, Reviewed the pertinent medical history, Discussed the surgical course, Reviewed Intra-OP anesthesia mangement and issues during anesthesia, Set expectations for post-procedure period and Allowed opportunity for questions and acknowledgement of understanding    Vitals:  Vitals Value Taken Time   BP     Temp     Pulse 71 01/24/24 0830   Resp     SpO2         Electronically Signed By: LEIF Mena CRNA  January 24, 2024  8:34 AM

## 2024-01-24 NOTE — ANESTHESIA POSTPROCEDURE EVALUATION
Patient: Chinedu Houser    Procedure: Procedure(s):  Colonoscopy with CO2 insufflation       Anesthesia Type:  MAC    Note:  Disposition: Outpatient   Postop Pain Control: Uneventful            Sign Out: Well controlled pain   PONV: No   Neuro/Psych: Uneventful            Sign Out: Acceptable/Baseline neuro status   Airway/Respiratory: Uneventful            Sign Out: Acceptable/Baseline resp. status   CV/Hemodynamics: Uneventful            Sign Out: Acceptable CV status; No obvious hypovolemia; No obvious fluid overload   Other NRE: NONE   DID A NON-ROUTINE EVENT OCCUR? No           Last vitals:  Vitals Value Taken Time   /68 01/24/24 0850   Temp 97.5  F (36.4  C) 01/24/24 0850   Pulse     Resp 16 01/24/24 0850   SpO2 97 % 01/24/24 0850       Electronically Signed By: Adilson Dominguez DO  January 24, 2024  11:22 AM

## 2024-01-24 NOTE — H&P
ENDOSCOPY PRE-SEDATION H&P FOR OUTPATIENT PROCEDURES    Chinedu Houser  [id number]  1955    Procedure: colonoscopy    Pre-procedure diagnosis: CRCS    Past medical history:   Past Medical History:   Diagnosis Date    Arthritis 7-27-22    Results of MRI at [facility] 07/07/2020       Past surgical history:   Past Surgical History:   Procedure Laterality Date    ARTHROSCOPY KNEE      1986,both knees    COLONOSCOPY      2003    COLONOSCOPY  12/11/2013 12/2013,Normal - follow up 10 years    TONSILLECTOMY, ADENOIDECTOMY, COMBINED      T & A       Current Outpatient Medications   Medication    fish oil-omega-3 fatty acids 1000 MG capsule    Glucosamine Sulfate 500 MG TABS    MAGNESIUM PO    simvastatin (ZOCOR) 10 MG tablet    vitamin C (ASCORBIC ACID) 1000 MG TABS    Vitamin D, Cholecalciferol, 10 MCG (400 UNIT) TABS     Current Facility-Administered Medications   Medication    lactated ringers infusion    lidocaine (LMX4) kit    lidocaine 1 % 0.1-1 mL    ondansetron (ZOFRAN) injection 4 mg    sodium chloride (PF) 0.9% PF flush 3 mL    sodium chloride (PF) 0.9% PF flush 3 mL       No Known Allergies    History of Anesthesia/Sedation Problems: no    PHYSICAL EXAMINATION:  Constitutional: aaox3, cooperative, pleasant  Vitals reviewed: BP (!) 151/84   Pulse 73   Temp 97.4  F (36.3  C) (Tympanic)   Resp 18   SpO2 98%   Wt:   Wt Readings from Last 2 Encounters:   10/30/23 90.9 kg (200 lb 6.4 oz)   10/17/22 84.4 kg (186 lb)      Eyes: Sclera anicteric/injected  Ears/nose/mouth/throat: Normal oropharynx without ulcers or exudate, mucus membranes moist, hearing intact  Neck: supple, thyroid normal size  CV: No edema  Respiratory: Unlabored breathing  Lymph: No submandibular, supraclavicular or inguinal lymphadenopathy  Abd: Nondistended, no masses, nontender  Skin: warm, perfused, no jaundice  Psych: Normal affect  MSK: normal movement on limited exam.    ASA Score: See Provation note    Assessment/Plan:      The patient is an appropriate candidate to receive sedation.    Informed consent was discussed with the patient/family, including the risks, benefits, potential complications and any alternative options associated with sedation.    Patient assessment completed just prior to sedation and while under constant observation by the provider. Condition determined to be adequate for proceeding with sedation.    The specific risks for the procedure were discussed with the patient at the time of informed consent and include but are not limited to perforation which could require surgery, missing significant neoplasm or lesion, hemorrhage and adverse sedative complication.      Tucker Navarro MD

## 2024-01-24 NOTE — ANESTHESIA PREPROCEDURE EVALUATION
Anesthesia Pre-Procedure Evaluation    Patient: Chinedu Houser   MRN: [id number] : 1955        Procedure : Procedure(s):  Colonoscopy with CO2 insufflation          Past Medical History:   Diagnosis Date    Arthritis 20    Results of MRI at [facility] 2020      Past Surgical History:   Procedure Laterality Date    ARTHROSCOPY KNEE      ,both knees    COLONOSCOPY          COLONOSCOPY  2013,Normal - follow up 10 years    TONSILLECTOMY, ADENOIDECTOMY, COMBINED      T & A      No Known Allergies   Social History     Tobacco Use    Smoking status: Former     Packs/day: 0.00     Years: 10.00     Additional pack years: 0.00     Total pack years: 0.00     Types: Cigarettes     Start date: 6/15/1972     Quit date: 1985     Years since quittin.0    Smokeless tobacco: Never   Substance Use Topics    Alcohol use: No      Wt Readings from Last 1 Encounters:   10/30/23 90.9 kg (200 lb 6.4 oz)        Anesthesia Evaluation            ROS/MED HX  ENT/Pulmonary:  - neg pulmonary ROS     Neurologic:  - neg neurologic ROS     Cardiovascular:     (+)  hypertension- -   -  - -                                      METS/Exercise Tolerance: >4 METS    Hematologic:  - neg hematologic  ROS     Musculoskeletal:   (+)  arthritis,             GI/Hepatic:  - neg GI/hepatic ROS     Renal/Genitourinary:  - neg Renal ROS     Endo:  - neg endo ROS     Psychiatric/Substance Use:  - neg psychiatric ROS     Infectious Disease:  - neg infectious disease ROS     Malignancy:  - neg malignancy ROS     Other:  - neg other ROS          Physical Exam    Airway  airway exam normal      Mallampati: I   TM distance: > 3 FB   Neck ROM: full   Mouth opening: > 3 cm    Respiratory Devices and Support         Dental       (+) Completely normal teeth      Cardiovascular   cardiovascular exam normal       Rhythm and rate: regular and normal     Pulmonary   pulmonary exam normal        breath sounds  "clear to auscultation           OUTSIDE LABS:  CBC:   Lab Results   Component Value Date    WBC 8.2 04/23/2019    HGB 15.6 04/23/2019    HGB 15.0 11/17/2014    HCT 47.2 04/23/2019    HCT 45.0 11/17/2014     04/23/2019     11/17/2014     BMP:   Lab Results   Component Value Date     10/26/2023     09/01/2022    POTASSIUM 4.0 10/26/2023    POTASSIUM 4.8 09/01/2022    CHLORIDE 103 10/26/2023    CHLORIDE 104 09/01/2022    CO2 23 10/26/2023    CO2 29 09/01/2022    BUN 10.5 10/26/2023    BUN 8 09/01/2022    CR 0.85 10/26/2023    CR 0.75 09/01/2022    GLC 95 10/26/2023    GLC 96 09/01/2022     COAGS: No results found for: \"PTT\", \"INR\", \"FIBR\"  POC: No results found for: \"BGM\", \"HCG\", \"HCGS\"  HEPATIC:   Lab Results   Component Value Date    ALBUMIN 4.4 10/26/2023    PROTTOTAL 7.4 10/26/2023    ALT 28 10/26/2023    AST 34 10/26/2023    ALKPHOS 59 10/26/2023    BILITOTAL 1.9 (H) 10/26/2023     OTHER:   Lab Results   Component Value Date    CLINT 9.5 10/26/2023       Anesthesia Plan    ASA Status:  1    NPO Status:  NPO Appropriate    Anesthesia Type: MAC.     - Reason for MAC: Deep or markedly invasive procedure (G8)   Induction: Propofol.   Maintenance: N/A.        Consents    Anesthesia Plan(s) and associated risks, benefits, and realistic alternatives discussed. Questions answered and patient/representative(s) expressed understanding.     - Discussed:     - Discussed with:  Patient       Use of blood products discussed: No .     Postoperative Care            Comments:           H&P reviewed: Unable to attach H&P to encounter due to EHR limitations. H&P Update: appropriate H&P reviewed, patient examined. No interval changes since H&P (within 30 days).        Adilson Dominguez, DO    I have reviewed the pertinent notes and labs in the chart from the past 30 days and (re)examined the patient.  Any updates or changes from those notes are reflected in this note.                  "

## 2024-01-26 ENCOUNTER — OFFICE VISIT (OUTPATIENT)
Dept: FAMILY MEDICINE | Facility: CLINIC | Age: 69
End: 2024-01-26
Payer: COMMERCIAL

## 2024-01-26 VITALS
OXYGEN SATURATION: 100 % | HEART RATE: 48 BPM | WEIGHT: 199 LBS | BODY MASS INDEX: 28.49 KG/M2 | HEIGHT: 70 IN | TEMPERATURE: 97.9 F | RESPIRATION RATE: 16 BRPM | SYSTOLIC BLOOD PRESSURE: 138 MMHG | DIASTOLIC BLOOD PRESSURE: 82 MMHG

## 2024-01-26 DIAGNOSIS — H66.93 BILATERAL OTITIS MEDIA, UNSPECIFIED OTITIS MEDIA TYPE: Primary | ICD-10-CM

## 2024-01-26 PROCEDURE — 99213 OFFICE O/P EST LOW 20 MIN: CPT | Performed by: PHYSICIAN ASSISTANT

## 2024-01-26 RX ORDER — RESPIRATORY SYNCYTIAL VIRUS VACCINE 120MCG/0.5
0.5 KIT INTRAMUSCULAR ONCE
Qty: 1 EACH | Refills: 0 | Status: CANCELLED | OUTPATIENT
Start: 2024-01-26 | End: 2024-01-26

## 2024-01-26 RX ORDER — CEFDINIR 300 MG/1
300 CAPSULE ORAL 2 TIMES DAILY
Qty: 14 CAPSULE | Refills: 0 | Status: SHIPPED | OUTPATIENT
Start: 2024-01-26 | End: 2024-02-02

## 2024-01-26 RX ORDER — OXYMETAZOLINE HYDROCHLORIDE 0.05 G/100ML
2 SPRAY NASAL 2 TIMES DAILY
Qty: 15 ML | Refills: 0 | Status: SHIPPED | OUTPATIENT
Start: 2024-01-26 | End: 2024-01-29

## 2024-01-26 ASSESSMENT — PAIN SCALES - GENERAL: PAINLEVEL: NO PAIN (0)

## 2024-01-26 NOTE — PROGRESS NOTES
Assessment & Plan     (H66.93) Bilateral otitis media, unspecified otitis media type  (primary encounter diagnosis)  Comment: The patient presents with bilateral otitis media.  On examination has mucopurulent effusion on the right.  Clear effusion on the left.  With longevity of symptoms will cover with cefdinir.  Discussed with patient trial of Afrin nasal spray.  If no improvement in 7 to 10 days repeat evaluation.  Follow-up with ENT for continued symptoms.  The patient was in agreement with this plan.  Plan: cefdinir (OMNICEF) 300 MG capsule,         oxymetazoline (AFRIN) 0.05 % nasal spray            Héctor Mclaughlin is a 68 year old, presenting for the following health issues:  Ear Problem (Pt said that it feel like fluid in his ears. )    Via the Health Maintenance questionnaire, the patient has reported the following services have been completed -Colonscopy, this information has been sent to the abstraction team.  History of Present Illness       Reason for visit:  Ears feel like they have water in them  Symptom onset:  1-2 weeks ago  Symptoms include:  Some hearing loss, discomfort  Symptom intensity:  Mild  Symptom progression:  Staying the same  Had these symptoms before:  No  What makes it worse:  No  What makes it better:  No    He eats 2-3 servings of fruits and vegetables daily.He consumes 0 sweetened beverage(s) daily.He exercises with enough effort to increase his heart rate 20 to 29 minutes per day.  He exercises with enough effort to increase his heart rate 3 or less days per week.   He is taking medications regularly.     Around Bg time patient developed cold-like symptoms.  Was having cough that is largely subsided.  Over the last 2 to 3 weeks has had ongoing ear pressure and fullness.  Has been right greater than left.  No fevers.  Recently started on hearing aids from [employer].  Did follow-up with [employer] for evaluation of the was told he had fluid behind the ear and should have clinic  "visit.  Patient continues to have ear symptoms with pressure and fullness.       Review of Systems  Constitutional, HEENT, cardiovascular, pulmonary, gi and gu systems are negative, except as otherwise noted.      Objective    /82   Pulse (!) 48   Temp 97.9  F (36.6  C) (Tympanic)   Resp 16   Ht 1.765 m (5' 9.5\")   Wt 90.3 kg (199 lb)   SpO2 100%   BMI 28.97 kg/m    Body mass index is 28.97 kg/m .  Physical Exam   GENERAL: alert and no distress  EYES: Eyes grossly normal to inspection, PERRL and conjunctivae and sclerae normal  HENT: normal cephalic/atraumatic, right ear: mucopurulent effusion, left ear: clear effusion, nose and mouth without ulcers or lesions, oropharynx clear, and oral mucous membranes moist  RESP: lungs clear to auscultation - no rales, rhonchi or wheezes  CV: regular rate and rhythm, normal S1 S2, no S3 or S4, no murmur, click or rub, no peripheral edema  MS: no gross musculoskeletal defects noted, no edema            Signed Electronically by: Austin Jefferson PA-C    "

## 2024-10-29 ENCOUNTER — DOCUMENTATION ONLY (OUTPATIENT)
Dept: LAB | Facility: CLINIC | Age: 69
End: 2024-10-29
Payer: COMMERCIAL

## 2024-10-29 DIAGNOSIS — Z13.6 CARDIOVASCULAR SCREENING; LDL GOAL LESS THAN 130: ICD-10-CM

## 2024-10-29 DIAGNOSIS — Z00.00 PREVENTATIVE HEALTH CARE: ICD-10-CM

## 2024-10-29 DIAGNOSIS — Z12.5 SCREENING PSA (PROSTATE SPECIFIC ANTIGEN): Primary | ICD-10-CM

## 2024-10-29 NOTE — PROGRESS NOTES
Chinedu FRANCESCO Houser has an upcoming lab appointment:    Future Appointments   Date Time Provider Department Center   11/4/2024  8:15 AM [facility]   11/5/2024  8:00 AM [facility] Pickett [facility]     There is no order available. Please review and place either future orders or HMPO (Review of Health Maintenance Protocol Orders), as appropriate.    Health Maintenance Due   Topic    ANNUAL REVIEW OF HM ORDERS     KRISTI Olvera

## 2024-11-04 ENCOUNTER — LAB (OUTPATIENT)
Dept: LAB | Facility: CLINIC | Age: 69
End: 2024-11-04
Payer: COMMERCIAL

## 2024-11-04 DIAGNOSIS — Z12.5 SCREENING PSA (PROSTATE SPECIFIC ANTIGEN): ICD-10-CM

## 2024-11-04 DIAGNOSIS — Z13.6 CARDIOVASCULAR SCREENING; LDL GOAL LESS THAN 130: ICD-10-CM

## 2024-11-04 DIAGNOSIS — Z00.00 PREVENTATIVE HEALTH CARE: ICD-10-CM

## 2024-11-04 LAB
ALBUMIN SERPL BCG-MCNC: 4.1 G/DL (ref 3.5–5.2)
ALP SERPL-CCNC: 63 U/L (ref 40–150)
ALT SERPL W P-5'-P-CCNC: 24 U/L (ref 0–70)
ANION GAP SERPL CALCULATED.3IONS-SCNC: 9 MMOL/L (ref 7–15)
AST SERPL W P-5'-P-CCNC: 27 U/L (ref 0–45)
BILIRUB SERPL-MCNC: 0.9 MG/DL
BUN SERPL-MCNC: 9 MG/DL (ref 8–23)
CALCIUM SERPL-MCNC: 9.2 MG/DL (ref 8.8–10.4)
CHLORIDE SERPL-SCNC: 104 MMOL/L (ref 98–107)
CHOLEST SERPL-MCNC: 144 MG/DL
CREAT SERPL-MCNC: 0.83 MG/DL (ref 0.67–1.17)
EGFRCR SERPLBLD CKD-EPI 2021: >90 ML/MIN/1.73M2
FASTING STATUS PATIENT QL REPORTED: YES
FASTING STATUS PATIENT QL REPORTED: YES
GLUCOSE SERPL-MCNC: 100 MG/DL (ref 70–99)
HCO3 SERPL-SCNC: 27 MMOL/L (ref 22–29)
HDLC SERPL-MCNC: 48 MG/DL
LDLC SERPL CALC-MCNC: 84 MG/DL
NONHDLC SERPL-MCNC: 96 MG/DL
POTASSIUM SERPL-SCNC: 4.1 MMOL/L (ref 3.4–5.3)
PROT SERPL-MCNC: 7.1 G/DL (ref 6.4–8.3)
PSA SERPL DL<=0.01 NG/ML-MCNC: 1.39 NG/ML (ref 0–6.5)
SODIUM SERPL-SCNC: 140 MMOL/L (ref 135–145)
TRIGL SERPL-MCNC: 60 MG/DL

## 2024-11-04 PROCEDURE — 36415 COLL VENOUS BLD VENIPUNCTURE: CPT

## 2024-11-04 PROCEDURE — G0103 PSA SCREENING: HCPCS

## 2024-11-04 PROCEDURE — 80061 LIPID PANEL: CPT

## 2024-11-04 PROCEDURE — 80053 COMPREHEN METABOLIC PANEL: CPT

## 2024-11-04 SDOH — HEALTH STABILITY: PHYSICAL HEALTH: ON AVERAGE, HOW MANY MINUTES DO YOU ENGAGE IN EXERCISE AT THIS LEVEL?: 40 MIN

## 2024-11-04 SDOH — HEALTH STABILITY: PHYSICAL HEALTH: ON AVERAGE, HOW MANY DAYS PER WEEK DO YOU ENGAGE IN MODERATE TO STRENUOUS EXERCISE (LIKE A BRISK WALK)?: 3 DAYS

## 2024-11-04 ASSESSMENT — SOCIAL DETERMINANTS OF HEALTH (SDOH): HOW OFTEN DO YOU GET TOGETHER WITH FRIENDS OR RELATIVES?: ONCE A WEEK

## 2024-11-05 ENCOUNTER — OFFICE VISIT (OUTPATIENT)
Dept: FAMILY MEDICINE | Facility: CLINIC | Age: 69
End: 2024-11-05
Attending: FAMILY MEDICINE
Payer: COMMERCIAL

## 2024-11-05 VITALS
BODY MASS INDEX: 29.36 KG/M2 | HEIGHT: 69 IN | SYSTOLIC BLOOD PRESSURE: 145 MMHG | OXYGEN SATURATION: 99 % | DIASTOLIC BLOOD PRESSURE: 70 MMHG | TEMPERATURE: 98.3 F | RESPIRATION RATE: 16 BRPM | WEIGHT: 198.2 LBS | HEART RATE: 46 BPM

## 2024-11-05 DIAGNOSIS — Z82.49 FHX: EARLY CORONARY ARTERY DISEASE: ICD-10-CM

## 2024-11-05 DIAGNOSIS — E78.5 HYPERLIPIDEMIA LDL GOAL <130: ICD-10-CM

## 2024-11-05 DIAGNOSIS — I10 HYPERTENSION GOAL BP (BLOOD PRESSURE) < 140/90: ICD-10-CM

## 2024-11-05 DIAGNOSIS — Z00.00 ENCOUNTER FOR MEDICARE ANNUAL WELLNESS EXAM: Primary | ICD-10-CM

## 2024-11-05 PROCEDURE — G0439 PPPS, SUBSEQ VISIT: HCPCS | Performed by: FAMILY MEDICINE

## 2024-11-05 PROCEDURE — 99214 OFFICE O/P EST MOD 30 MIN: CPT | Mod: 25 | Performed by: FAMILY MEDICINE

## 2024-11-05 RX ORDER — AMLODIPINE BESYLATE 5 MG/1
5 TABLET ORAL DAILY
Qty: 90 TABLET | Refills: 1 | Status: SHIPPED | OUTPATIENT
Start: 2024-11-05

## 2024-11-05 RX ORDER — SIMVASTATIN 10 MG
10 TABLET ORAL AT BEDTIME
Qty: 90 TABLET | Refills: 3 | Status: SHIPPED | OUTPATIENT
Start: 2024-11-05

## 2024-11-05 NOTE — PROGRESS NOTES
Preventive Care Visit  [facility]  Piyush Pickett MD, Family Medicine  Nov 5, 2024      ASSESSMENT / PLAN:  (Z00.00) Encounter for Medicare annual wellness exam  (primary encounter diagnosis)  Comment: generally healthy and normal exam/labs  Plan: lower carb diet. Reviewed self mole/testicle check handout.  Good support with family and memory ok    (E78.5) Hyperlipidemia LDL goal <130  Comment: stable  Plan: simvastatin (ZOCOR) 10 MG tablet, CT Coronary         Calcium Scan        Consider changing to lipitor 20mg if +calcium scan  Lower carb/seed oils in diet  (I10) Hypertension goal BP (blood pressure) < 140/90  Comment: needs help  Plan: amLODIPine (NORVASC) 5 MG tablet, CT Coronary         Calcium Scan        Reveiwed risks and side effects of medication  Self--monitor and telephone visit in one month. Chest pain or shortness of breath to er. Continue exercise.     (Z82.49) FHx: early coronary artery disease  Comment: 3 older brothers. Patient denies chest pain or shortness of breath   Plan: CT Coronary Calcium Scan        Cardiology if severe vs increased statin if moderate      Subjective   Arnoldo is a 69 year old, presenting for the following:  Wellness Visit  Follow-up high cholesterol and elevated blood pressure   Exercise x3/week.   No chest pain or shortness of breath. Outside blood pressure a little high.  Colonoscopy done last year  No nausea, vomiting or diarrhea or black/bloody stools. No urine changes or  Hematuria.   2 pots coffee/day. Needs more water. No pop. No ALCOHOL. No rashes/mole changes.   Taking vitaminD. Drinks milk. Eye exam -recently. Dentist regularly.   No testicle pain/masses/hernia.   emotoinally doing ok.  and 4 kids. 7 grandkids. cpap for harry.  No falls. Memory doing ok.       11/5/2024     7:32 AM   Additional Questions   Roomed by Leni   Accompanied by self         11/5/2024     7:32 AM   Patient Reported Additional Medications   Patient reports  taking the following new medications n/a           HPI        Health Care Directive  Patient does not have a Health Care Directive: Discussed advance care planning with patient; information given to patient to review.      11/4/2024   General Health   How would you rate your overall physical health? Good   Feel stress (tense, anxious, or unable to sleep) Not at all            11/4/2024   Nutrition   Diet: Regular (no restrictions)            11/4/2024   Exercise   Days per week of moderate/strenous exercise 3 days   Average minutes spent exercising at this level 40 min            11/4/2024   Social Factors   Frequency of gathering with friends or relatives Once a week   Worry food won't last until get money to buy more No    No   Food not last or not have enough money for food? No    No   Do you have housing? (Housing is defined as stable permanent housing and does not include staying ouside in a car, in a tent, in an abandoned building, in an overnight shelter, or couch-surfing.) Yes    Yes   Are you worried about losing your housing? No    No   Lack of transportation? No    No   Unable to get utilities (heat,electricity)? No    Yes   Want help with housing or utility concern? No       Multiple values from one day are sorted in reverse-chronological order         11/5/2024   Fall Risk   Gait Speed Test (Document in seconds) 4.6    4.6   Gait Speed Test Interpretation Less than or equal to 5.00 seconds - PASS    Less than or equal to 5.00 seconds - PASS       Multiple values from one day are sorted in reverse-chronological order          11/4/2024   Activities of Daily Living- Home Safety   Needs help with the following daily activites None of the above   Safety concerns in the home Throw rugs in the hallway            11/4/2024   Dental   Dentist two times every year? Yes            11/4/2024   Hearing Screening   Hearing concerns? None of the above            11/4/2024   Driving Risk Screening   Patient/family  members have concerns about driving No            2024   General Alertness/Fatigue Screening   Have you been more tired than usual lately? (!) YES            2024   Urinary Incontinence Screening   Bothered by leaking urine in past 6 months No            2024   TB Screening   Were you born outside of the [place]? No            Today's PHQ-2 Score:       2024    10:02 AM   PHQ-2 (  [employer])   Q1: Little interest or pleasure in doing things 0    Q2: Feeling down, depressed or hopeless 0    PHQ-2 Score 0    Q1: Little interest or pleasure in doing things Not at all   Q2: Feeling down, depressed or hopeless Not at all   PHQ-2 Score 0       Patient-reported           2024   Substance Use   Alcohol more than 3/day or more than 7/wk Not Applicable   Do you have a current opioid prescription? No   How severe/bad is pain from 1 to 10? 1/10   Do you use any other substances recreationally? No        Social History     Tobacco Use    Smoking status: Former     Current packs/day: 0.00     Types: Cigarettes     Start date: 6/15/1972     Quit date: 1985     Years since quittin.8    Smokeless tobacco: Never   Vaping Use    Vaping status: Never Used   Substance Use Topics    Alcohol use: No    Drug use: No     Types: Other     Comment: Drug use: No       ASCVD Risk   The 10-year ASCVD risk score (Devin BRAVO, et al., 2019) is: 25%    Values used to calculate the score:      Age: 69 years      Sex: Male      Is Non- : No      Diabetic: No      Tobacco smoker: No      Systolic Blood Pressure: 164 mmHg      Is BP treated: No      HDL Cholesterol: 48 mg/dL      Total Cholesterol: 144 mg/dL            Reviewed and updated as needed this visit by Provider                      Current providers sharing in care for this patient include:  Patient Care Team:  Piyush Pickett MD as PCP - General (Family Medicine)  Piyush Pickett MD as Assigned PCP    The following [facility]  "maintenance items are reviewed in Epic and correct as of today:  Health Maintenance   Topic Date Due    ANNUAL REVIEW OF HM ORDERS  Never done    LIPID  11/04/2025    MEDICARE ANNUAL WELLNESS VISIT  11/05/2025    FALL RISK ASSESSMENT  11/05/2025    GLUCOSE  11/04/2027    ADVANCE CARE PLANNING  10/30/2028    DTAP/TDAP/TD IMMUNIZATION (4 - Td or Tdap) 12/22/2033    COLORECTAL CANCER SCREENING  01/24/2034    HEPATITIS C SCREENING  Completed    PHQ-2 (once per calendar year)  Completed    INFLUENZA VACCINE  Completed    Pneumococcal Vaccine: 65+ Years  Completed    ZOSTER IMMUNIZATION  Completed    RSV VACCINE  Completed    AORTIC ANEURYSM SCREENING (SYSTEM ASSIGNED)  Completed    COVID-19 Vaccine  Completed    HPV IMMUNIZATION  Aged Out    MENINGITIS IMMUNIZATION  Aged Out    RSV MONOCLONAL ANTIBODY  Aged Out            Objective    Exam  BP (!) 145/70   Pulse (!) 46   Temp 98.3  F (36.8  C) (Tympanic)   Resp 16   Ht 1.74 m (5' 8.5\")   Wt 89.9 kg (198 lb 3.2 oz)   SpO2 99%   BMI 29.70 kg/m        Physical Exam  GENERAL: alert and no distress  EYES: Eyes grossly normal to inspection, PERRL and conjunctivae and sclerae normal  HENT: ear canals and TM's normal, nose and mouth without ulcers or lesions  NECK: no adenopathy, no asymmetry, masses, or scars  RESP: lungs clear to auscultation - no rales, rhonchi or wheezes  BREAST: normal without masses, tenderness or nipple discharge and no palpable axillary masses or adenopathy  CV: regular rate and rhythm, normal S1 S2, no S3 or S4, no murmur, click or rub, no peripheral edema   ABDOMEN: soft, nontender, no hepatosplenomegaly, no masses and bowel sounds normal  MS: no gross musculoskeletal defects noted, no edema  SKIN: no suspicious lesions or rashes  NEURO: Normal strength and tone, mentation intact and speech normal  PSYCH: mentation appears normal, affect normal/bright  LYMPH: no cervical, supraclavicular, axillary adenopathy        11/5/2024   Mini Cog   Clock " Draw Score 2 Normal    2 Normal   3 Item Recall 3 objects recalled    3 objects recalled   Mini Cog Total Score 5    5       Multiple values from one day are sorted in reverse-chronological order              Signed Electronically by: Piyush Pickett MD

## 2024-11-05 NOTE — PATIENT INSTRUCTIONS
Patient Education   Preventive Care Advice   This is general advice given by our system to help you stay healthy. However, your care team may have specific advice just for you. Please talk to your care team about your preventive care needs.  Nutrition  Eat 5 or more servings of fruits and vegetables each day.  Try wheat bread, brown rice and whole grain pasta (instead of white bread, rice, and pasta).  Get enough calcium and vitamin D. Check the label on foods and aim for 100% of the RDA (recommended daily allowance).  Lifestyle  Exercise at least 150 minutes each week  (30 minutes a day, 5 days a week).  Do muscle strengthening activities 2 days a week. These help control your weight and prevent disease.  No smoking.  Wear sunscreen to prevent skin cancer.  Have a dental exam and cleaning every 6 months.  Yearly exams  See your health care team every year to talk about:  Any changes in your health.  Any medicines your care team has prescribed.  Preventive care, family planning, and ways to prevent chronic diseases.  Shots (vaccines)   HPV shots (up to age 26), if you've never had them before.  Hepatitis B shots (up to age 59), if you've never had them before.  COVID-19 shot: Get this shot when it's due.  Flu shot: Get a flu shot every year.  Tetanus shot: Get a tetanus shot every 10 years.  Pneumococcal, hepatitis A, and RSV shots: Ask your care team if you need these based on your risk.  Shingles shot (for age 50 and up)  General health tests  Diabetes screening:  Starting at age 35, Get screened for diabetes at least every 3 years.  If you are younger than age 35, ask your care team if you should be screened for diabetes.  Cholesterol test: At age 39, start having a cholesterol test every 5 years, or more often if advised.  Bone density scan (DEXA): At age 50, ask your care team if you should have this scan for osteoporosis (brittle bones).  Hepatitis C: Get tested at least once in your life.  STIs (sexually  transmitted infections)  Before age 24: Ask your care team if you should be screened for STIs.  After age 24: Get screened for STIs if you're at risk. You are at risk for STIs (including HIV) if:  You are sexually active with more than one person.  You don't use condoms every time.  You or a partner was diagnosed with a sexually transmitted infection.  If you are at risk for HIV, ask about PrEP medicine to prevent HIV.  Get tested for HIV at least once in your life, whether you are at risk for HIV or not.  Cancer screening tests  Cervical cancer screening: If you have a cervix, begin getting regular cervical cancer screening tests starting at age 21.  Breast cancer scan (mammogram): If you've ever had breasts, begin having regular mammograms starting at age 40. This is a scan to check for breast cancer.  Colon cancer screening: It is important to start screening for colon cancer at age 45.  Have a colonoscopy test every 10 years (or more often if you're at risk) Or, ask your provider about stool tests like a FIT test every year or Cologuard test every 3 years.  To learn more about your testing options, visit:   .  For help making a decision, visit:   https://bit.ly/cm36855.  Prostate cancer screening test: If you have a prostate, ask your care team if a prostate cancer screening test (PSA) at age 55 is right for you.  Lung cancer screening: If you are a current or former smoker ages 50 to 80, ask your care team if ongoing lung cancer screenings are right for you.  For informational purposes only. Not to replace the advice of your health care provider. Copyright   2023 [facility]. All rights reserved. Clinically reviewed by the [facility] Transitions Program. [employer] [id number] - REV 01/24.  Learning About Activities of Daily Living  What are activities of daily living?     Activities of daily living (ADLs) are the basic self-care tasks you do every day. These include eating, bathing, dressing,  and moving around.  As you age, and if you have health problems, you may find that it's harder to do some of these tasks. If so, your doctor can suggest ideas that may help.  To measure what kind of help you may need, your doctor will ask how well you are able to do ADLs. Let your doctor know if there are any tasks that you are having trouble doing. This is an important first step to getting help. And when you have the help you need, you can stay as independent as possible.  How will a doctor assess your ADLs?  Asking about ADLs is part of a routine health checkup your doctor will likely do as you age. Your health check might be done in a doctor's office, in your home, or at a hospital. The goal is to find out if you are having any problems that could make it hard to care for yourself or that make it unsafe for you to be on your own.  To measure your ADLs, your doctor will ask how hard it is for you to do routine tasks. Your doctor may also want to know if you have changed the way you do a task because of a health problem. Your doctor may watch how you:  Walk back and forth.  Keep your balance while you stand or walk.  Move from sitting to standing or from a bed to a chair.  Button or unbutton a shirt or sweater.  Remove and put on your shoes.  It's common to feel a little worried or anxious if you find you can't do all the things you used to be able to do. Talking with your doctor about ADLs is a way to make sure you're as safe as possible and able to care for yourself as well as you can. You may want to bring a caregiver, friend, or family member to your checkup. They can help you talk to your doctor.  Follow-up care is a key part of your treatment and safety. Be sure to make and go to all appointments, and call your doctor if you are having problems. It's also a good idea to know your test results and keep a list of the medicines you take.  Current as of: October 24, 2023  Content Version: 14.2 2024 [facility]  [employer].   Care instructions adapted under license by your healthcare professional. If you have questions about a medical condition or this instruction, always ask your healthcare professional. Healthwise, Incorporated disclaims any warranty or liability for your use of this information.    Preventing Falls: Care Instructions  Injuries and health problems such as trouble walking or poor eyesight can increase your risk of falling. So can some medicines. But there are things you can do to help prevent falls. You can exercise to get stronger. You can also arrange your home to make it safer.    Talk to your doctor about the medicines you take. Ask if any of them increase the risk of falls and whether they can be changed or stopped.   Try to exercise regularly. It can help improve your strength and balance. This can help lower your risk of falling.         Practice fall safety and prevention.   Wear low-heeled shoes that fit well and give your feet good support. Talk to your doctor if you have foot problems that make this hard.  Carry a cellphone or wear a medical alert device that you can use to call for help.  Use stepladders instead of chairs to reach high objects. Don't climb if you're at risk for falls. Ask for help, if needed.  Wear the correct eyeglasses, if you need them.        Make your home safer.   Remove rugs, cords, clutter, and furniture from walkways.  Keep your house well lit. Use night-lights in hallways and bathrooms.  Install and use sturdy handrails on stairways.  Wear nonskid footwear, even inside. Don't walk barefoot or in socks without shoes.        Be safe outside.   Use handrails, curb cuts, and ramps whenever possible.  Keep your hands free by using a shoulder bag or backpack.  Try to walk in well-lit areas. Watch out for uneven ground, changes in pavement, and debris.  Be careful in the winter. Walk on the grass or gravel when sidewalks are slippery. Use de-icer on steps and walkways.  "Add non-slip devices to shoes.    Put grab bars and nonskid mats in your shower or tub and near the toilet. Try to use a shower chair or bath bench when bathing.   Get into a tub or shower by putting in your weaker leg first. Get out with your strong side first. Have a phone or medical alert device in the bathroom with you.   Where can you learn more?  Go to https://www.Salir.com.net/patiented  Enter G117 in the search box to learn more about \"Preventing Falls: Care Instructions.\"  Current as of: July 17, 2023  Content Version: 14.2 2024 [employer].   Care instructions adapted under license by your healthcare professional. If you have questions about a medical condition or this instruction, always ask your healthcare professional. Healthwise, Incorporated disclaims any warranty or liability for your use of this information.    Learning About Sleeping Well  What does sleeping well mean?     Sleeping well means getting enough sleep to feel good and stay healthy. How much sleep is enough varies among people.  The number of hours you sleep and how you feel when you wake up are both important. If you do not feel refreshed, you probably need more sleep. Another sign of not getting enough sleep is feeling tired during the day.  Experts recommend that adults get at least 7 or more hours of sleep per day. Children and older adults need more sleep.  Why is getting enough sleep important?  Getting enough quality sleep is a basic part of good health. When your sleep suffers, your physical health, mood, and your thoughts can suffer too. You may find yourself feeling more grumpy or stressed. Not getting enough sleep also can lead to serious problems, including injury, accidents, anxiety, and depression.  What might cause poor sleeping?  Many things can cause sleep problems, including:  Changes to your sleep schedule.  Stress. Stress can be caused by fear about a single event, such as giving a speech. Or you may " "have ongoing stress, such as worry about work or school.  Depression, anxiety, and other mental or emotional conditions.  Changes in your sleep habits or surroundings. This includes changes that happen where you sleep, such as noise, light, or sleeping in a different bed. It also includes changes in your sleep pattern, such as having jet lag or working a late shift.  Health problems, such as pain, breathing problems, and restless legs syndrome.  Lack of regular exercise.  Using alcohol, nicotine, or caffeine before bed.  How can you help yourself?  Here are some tips that may help you sleep more soundly and wake up feeling more refreshed.  Your sleeping area   Use your bedroom only for sleeping and sex. A bit of light reading may help you fall asleep. But if it doesn't, do your reading elsewhere in the house. Try not to use your TV, computer, smartphone, or tablet while you are in bed.  Be sure your bed is big enough to stretch out comfortably, especially if you have a sleep partner.  Keep your bedroom quiet, dark, and cool. Use curtains, blinds, or a sleep mask to block out light. To block out noise, use earplugs, soothing music, or a \"white noise\" machine.  Your evening and bedtime routine   Create a relaxing bedtime routine. You might want to take a warm shower or bath, or listen to soothing music.  Go to bed at the same time every night. And get up at the same time every morning, even if you feel tired.  What to avoid   Limit caffeine (coffee, tea, caffeinated sodas) during the day, and don't have any for at least 6 hours before bedtime.  Avoid drinking alcohol before bedtime. Alcohol can cause you to wake up more often during the night.  Try not to smoke or use tobacco, especially in the evening. Nicotine can keep you awake.  Limit naps during the day, especially close to bedtime.  Avoid lying in bed awake for too long. If you can't fall asleep or if you wake up in the middle of the night and can't get back to " "sleep within about 20 minutes, get out of bed and go to another room until you feel sleepy.  Avoid taking medicine right before bed that may keep you awake or make you feel hyper or energized. Your doctor can tell you if your medicine may do this and if you can take it earlier in the day.  If you can't sleep   Imagine yourself in a peaceful, pleasant scene. Focus on the details and feelings of being in a place that is relaxing.  Get up and do a quiet or boring activity until you feel sleepy.  Avoid drinking any liquids before going to bed to help prevent waking up often to use the bathroom.  Where can you learn more?  Go to https://www.Frontback.net/patiented  Enter J942 in the search box to learn more about \"Learning About Sleeping Well.\"  Current as of: July 10, 2023  Content Version: 14.2 2024 [facility] [employer].   Care instructions adapted under license by your healthcare professional. If you have questions about a medical condition or this instruction, always ask your healthcare professional. Healthwise, Incorporated disclaims any warranty or liability for your use of this information.       "

## 2024-11-21 ENCOUNTER — ANCILLARY PROCEDURE (OUTPATIENT)
Dept: CT IMAGING | Facility: CLINIC | Age: 69
End: 2024-11-21
Attending: FAMILY MEDICINE
Payer: COMMERCIAL

## 2024-11-21 DIAGNOSIS — I10 HYPERTENSION GOAL BP (BLOOD PRESSURE) < 140/90: ICD-10-CM

## 2024-11-21 DIAGNOSIS — E78.5 HYPERLIPIDEMIA LDL GOAL <130: ICD-10-CM

## 2024-11-21 DIAGNOSIS — Z82.49 FHX: EARLY CORONARY ARTERY DISEASE: ICD-10-CM

## 2024-11-21 PROCEDURE — 75571 CT HRT W/O DYE W/CA TEST: CPT | Performed by: INTERNAL MEDICINE

## 2024-11-25 NOTE — RESULT ENCOUNTER NOTE
"Patient is now scheduled \"in-person\" with Dr Pickett 12/9/24.Melly Goff [facility]    "

## 2024-11-26 ENCOUNTER — DOCUMENTATION ONLY (OUTPATIENT)
Dept: FAMILY MEDICINE | Facility: CLINIC | Age: 69
End: 2024-11-26
Payer: COMMERCIAL

## 2024-11-26 DIAGNOSIS — R05.9 COUGH, UNSPECIFIED TYPE: ICD-10-CM

## 2024-11-26 DIAGNOSIS — E78.5 HYPERLIPIDEMIA LDL GOAL <100: Primary | ICD-10-CM

## 2024-11-26 NOTE — PROGRESS NOTES
Chinedu FRANCESCO Houser has an upcoming lab appointment:    Future Appointments   Date Time Provider Department Center   12/6/2024  2:15 PM [facility]   12/9/2024 12:30 PM Piyush Pickett MD [facility]       There is no order available. Please review and place either future orders or HMPO (Review of Health Maintenance Protocol Orders), as appropriate.    Health Maintenance Due   Topic    ANNUAL REVIEW OF HM ORDERS      Sj PRADOT

## 2024-12-03 ENCOUNTER — OFFICE VISIT (OUTPATIENT)
Dept: CARDIOLOGY | Facility: CLINIC | Age: 69
End: 2024-12-03
Payer: COMMERCIAL

## 2024-12-03 VITALS
HEART RATE: 55 BPM | RESPIRATION RATE: 16 BRPM | HEIGHT: 69 IN | SYSTOLIC BLOOD PRESSURE: 132 MMHG | WEIGHT: 204 LBS | BODY MASS INDEX: 30.21 KG/M2 | DIASTOLIC BLOOD PRESSURE: 76 MMHG

## 2024-12-03 DIAGNOSIS — I10 HYPERTENSION GOAL BP (BLOOD PRESSURE) < 140/90: ICD-10-CM

## 2024-12-03 DIAGNOSIS — I25.119 CORONARY ARTERY DISEASE INVOLVING NATIVE CORONARY ARTERY OF NATIVE HEART WITH ANGINA PECTORIS (H): Primary | ICD-10-CM

## 2024-12-03 DIAGNOSIS — Z82.49 FHX: EARLY CORONARY ARTERY DISEASE: ICD-10-CM

## 2024-12-03 DIAGNOSIS — E78.5 HYPERLIPIDEMIA LDL GOAL <130: ICD-10-CM

## 2024-12-03 PROCEDURE — 93000 ELECTROCARDIOGRAM COMPLETE: CPT | Performed by: STUDENT IN AN ORGANIZED HEALTH CARE EDUCATION/TRAINING PROGRAM

## 2024-12-03 PROCEDURE — G2211 COMPLEX E/M VISIT ADD ON: HCPCS | Performed by: INTERNAL MEDICINE

## 2024-12-03 PROCEDURE — 99204 OFFICE O/P NEW MOD 45 MIN: CPT | Performed by: INTERNAL MEDICINE

## 2024-12-03 NOTE — PROGRESS NOTES
Thank you, Dr. Pickett, for asking [facility] to evaluate Mr. Chinedu Houser.      Assessment/Recommendations   Assessment:    Coronary calcification consistent with coronary atherosclerosis, no clear-cut angina  Hypercholesterolemia on statin  Hypertension, controlled    Plan:  We discussed primary prevention of cardiac events including lipid management, diet and exercise.  Will perform stress test to assess presence of myocardial ischemia ,further recommendations when results of the stress test available  Continue current cardiac medications including 81 mg of aspirin daily    The longitudinal plan of care for the diagnosis(es)/condition(s) as documented were addressed during this visit. Due to the added complexity in care, I will continue to support Arnoldo in the subsequent management and with ongoing continuity of care.      History of Present Illness    Mr. Chinedu Houser is a 69 year old male who comes in for cardiac evaluation.  He has been concerned about his family history of heart disease.  His brothers and father had cardiac events in the 40s and 60s.  The patient himself has never had any heart attacks.  He denies exertional chest pain or shortness of breath.  He has been taking cholesterol medication for a number of years.  Recently he underwent coronary calcium scan.  The results were abnormal thus cardiac consultation was requested.  He is fairly physically active.  He denies heart palpitations syncope.  He has not had waking, PND, orthopnea.  He is not known to have valvular heart disease or cardiomyopathy    ECG: Personally reviewed.  12/3/2024.  Sinus bradycardia, no ischemia or Q waves    CT coronary calcium scan: November 2024  1.  Severe coronary calcifications.  2.  The total Agatston calcium score is 1489 placing the patient in  the 90th percentile when compared to age and gender matched control  group.     Physical Examination Review of Systems   /76 (BP  "Location: Right arm, Patient Position: Sitting, Cuff Size: Adult Large)   Pulse 55   Resp 16   Ht 1.753 m (5' 9\")   Wt 92.5 kg (204 lb)   BMI 30.13 kg/m    Body mass index is 30.13 kg/m .  Wt Readings from Last 3 Encounters:   12/03/24 92.5 kg (204 lb)   11/05/24 89.9 kg (198 lb 3.2 oz)   01/26/24 90.3 kg (199 lb)     General Appearance:   Alert, cooperative, no distress, appears stated age   Head/ENT: Normocephalic, without obvious abnormality. Membranes moist      EYES:  no scleral icterus, normal conjunctivae   Neck: Supple, symmetrical, trachea midline, no adenopathy, thyroid: not enlarged, symmetric, no carotid bruit or JVD   Chest/Lungs:   Lungs are clear to auscultation, respirations unlabored. No tenderness or deformity    Cardiovascular:   Regular rhythm, S1, S2 normal, no murmur, rub or gallop.   Abdomen:  Soft, non-tender, bowel sounds active all four quadrants,  no masses, no organomegaly   Extremities: no cyanosis or clubbing. No edema   Skin: Skin color, texture, turgor normal, no rashes or lesions.    Psychiatric: Normal affect, calm   Neurologic: Alert and oriented x 3, moving all four extremities.     Encounter Vitals  BP: 132/76  Pulse: 55  Resp: 16  Weight: 92.5 kg (204 lb)  Height: 175.3 cm (5' 9\")                                          Lab Results    Chemistry/lipid CBC Cardiac Enzymes/BNP/TSH/INR   Recent Labs   Lab Test 11/04/24  0802   CHOL 144   HDL 48   LDL 84   TRIG 60     Recent Labs   Lab Test 11/04/24  0802 10/26/23  0708 09/01/22  0714   LDL 84 94 69     Recent Labs   Lab Test 11/04/24  0802      POTASSIUM 4.1   CHLORIDE 104   CO2 27   *   BUN 9.0   CR 0.83   GFRESTIMATED >90   CLINT 9.2     Recent Labs   Lab Test 11/04/24  0802 10/26/23  0708 09/01/22  0714   CR 0.83 0.85 0.75     No results for input(s): \"A1C\" in the last 64777 hours.       Recent Labs   Lab Test 04/23/19  0742   WBC 8.2   HGB 15.6   HCT 47.2   MCV 92        Recent Labs   Lab Test " "19  0742   HGB 15.6    No results for input(s): \"TROPONINI\" in the last 62391 hours.  No results for input(s): \"BNP\", \"NTBNPI\", \"NTBNP\" in the last 32281 hours.  No results for input(s): \"TSH\" in the last 02413 hours.  No results for input(s): \"INR\" in the last 53325 hours.     Medical History  Surgical History Family History Social History   Past Medical History:   Diagnosis Date    Arthritis 20    Results of MRI at [facility] 2020     Past Surgical History:   Procedure Laterality Date    ARTHROSCOPY KNEE      ,both knees    COLONOSCOPY          COLONOSCOPY  2013,Normal - follow up 10 years    COLONOSCOPY WITH CO2 INSUFFLATION N/A 2024    Procedure: Colonoscopy with CO2 insufflation;  Surgeon: Tucker Navarro MD;  Location: [facility] OR    TONSILLECTOMY, ADENOIDECTOMY, COMBINED      T & A     Father had heart attack in his 60s.  Brother has had stents in the 40s   Social History     Socioeconomic History    Marital status:      Spouse name: Not on file    Number of children: Not on file    Years of education: Not on file    Highest education level: Not on file   Occupational History    Occupation: OWNER   Tobacco Use    Smoking status: Former     Current packs/day: 0.00     Types: Cigarettes     Start date: 6/15/1972     Quit date: 1985     Years since quittin.9    Smokeless tobacco: Never   Vaping Use    Vaping status: Never Used   Substance and Sexual Activity    Alcohol use: No    Drug use: No     Types: Other     Comment: Drug use: No    Sexual activity: Not Currently     Partners: Female     Birth control/protection: None   Other Topics Concern    Parent/sibling w/ CABG, MI or angioplasty before 65F 55M? Yes   Social History Narrative     with 4 children.  Works as a jeweler.    Patient is a former smoker.  quite at 29 yrs of age    Alcohol Use - no    Preload  2013     Social Drivers of Health     Financial Resource " Strain: Low Risk  (11/4/2024)    Financial Resource Strain     Within the past 12 months, have you or your family members you live with been unable to get utilities (heat, electricity) when it was really needed?: No   Food Insecurity: Low Risk  (11/4/2024)    Food Insecurity     Within the past 12 months, did you worry that your food would run out before you got money to buy more?: No     Within the past 12 months, did the food you bought just not last and you didn t have money to get more?: No   Transportation Needs: Low Risk  (11/4/2024)    Transportation Needs     Within the past 12 months, has lack of transportation kept you from medical appointments, getting your medicines, non-medical meetings or appointments, work, or from getting things that you need?: No   Physical Activity: Insufficiently Active (11/4/2024)    Exercise Vital Sign     Days of Exercise per Week: 3 days     Minutes of Exercise per Session: 40 min   Stress: No Stress Concern Present (11/4/2024)    [place] [employer] - Occupational Stress Questionnaire     Feeling of Stress : Not at all   Social Connections: Unknown (11/4/2024)    Social Connection and Isolation Panel [NHANES]     Frequency of Communication with Friends and Family: Not on file     Frequency of Social Gatherings with Friends and Family: Once a week     Attends Restorationism Services: Not on file     Active Member of Clubs or Organizations: Not on file     Attends Club or Organization Meetings: Not on file     Marital Status: Not on file   Interpersonal Safety: Low Risk  (11/5/2024)    Interpersonal Safety     Do you feel physically and emotionally safe where you currently live?: Yes     Within the past 12 months, have you been hit, slapped, kicked or otherwise physically hurt by someone?: No     Within the past 12 months, have you been humiliated or emotionally abused in other ways by your partner or ex-partner?: No   Housing Stability: Low Risk  (11/4/2024)     Housing Stability     Do you have housing? : Yes     Are you worried about losing your housing?: No         Medications  Allergies   Current Outpatient Medications   Medication Sig Dispense Refill    amLODIPine (NORVASC) 5 MG tablet Take 1 tablet (5 mg) by mouth daily. New for blood pressure 90 tablet 1    atorvastatin (LIPITOR) 20 MG tablet Take 1 tablet (20 mg) by mouth daily. Replaces simvastatin (zocor) for cholesterol 90 tablet 1    fish oil-omega-3 fatty acids 1000 MG capsule       Glucosamine-Chondroitin 250-200 MG TABS       MAGNESIUM PO Take 1 tablet by mouth daily 75 mg      MULTIPLE VITAMINS-MINERALS PO       vitamin C (ASCORBIC ACID) 1000 MG TABS Take 1,000 mg by mouth daily      Vitamin D, Cholecalciferol, 10 MCG (400 UNIT) TABS         No Known Allergies

## 2024-12-03 NOTE — PATIENT INSTRUCTIONS
Chinedu Houser,    It was a pleasure to see you today at [facility].     My recommendations after this visit include:    Stress test    ESE Gaitan MD, FACC, PING

## 2024-12-03 NOTE — LETTER
12/3/2024    Piyuhs Pickett MD  [address] [facility] [address]    RE: Chinedu Houser       Dear Colleague,     I had the pleasure of seeing Chinedu Houser in the [facility].        Thank you, Dr. Pickett, for asking [facility] to evaluate Mr. Chinedu Houser.      Assessment/Recommendations   Assessment:    Coronary calcification consistent with coronary atherosclerosis, no clear-cut angina  Hypercholesterolemia on statin  Hypertension, controlled    Plan:  We discussed primary prevention of cardiac events including lipid management, diet and exercise.  Will perform stress test to assess presence of myocardial ischemia ,further recommendations when results of the stress test available  Continue current cardiac medications including 81 mg of aspirin daily    The longitudinal plan of care for the diagnosis(es)/condition(s) as documented were addressed during this visit. Due to the added complexity in care, I will continue to support Arnoldo in the subsequent management and with ongoing continuity of care.      History of Present Illness    Mr. Chinedu Houser is a 69 year old male who comes in for cardiac evaluation.  He has been concerned about his family history of heart disease.  His brothers and father had cardiac events in the 40s and 60s.  The patient himself has never had any heart attacks.  He denies exertional chest pain or shortness of breath.  He has been taking cholesterol medication for a number of years.  Recently he underwent coronary calcium scan.  The results were abnormal thus cardiac consultation was requested.  He is fairly physically active.  He denies heart palpitations syncope.  He has not had waking, PND, orthopnea.  He is not known to have valvular heart disease or cardiomyopathy    ECG: Personally reviewed.  12/3/2024.  Sinus bradycardia, no ischemia or Q waves    CT coronary calcium scan: November 2024  1.  Severe coronary  "calcifications.  2.  The total Agatston calcium score is 1489 placing the patient in  the 90th percentile when compared to age and gender matched control  group.     Physical Examination Review of Systems   /76 (BP Location: Right arm, Patient Position: Sitting, Cuff Size: Adult Large)   Pulse 55   Resp 16   Ht 1.753 m (5' 9\")   Wt 92.5 kg (204 lb)   BMI 30.13 kg/m    Body mass index is 30.13 kg/m .  Wt Readings from Last 3 Encounters:   12/03/24 92.5 kg (204 lb)   11/05/24 89.9 kg (198 lb 3.2 oz)   01/26/24 90.3 kg (199 lb)     General Appearance:   Alert, cooperative, no distress, appears stated age   Head/ENT: Normocephalic, without obvious abnormality. Membranes moist      EYES:  no scleral icterus, normal conjunctivae   Neck: Supple, symmetrical, trachea midline, no adenopathy, thyroid: not enlarged, symmetric, no carotid bruit or JVD   Chest/Lungs:   Lungs are clear to auscultation, respirations unlabored. No tenderness or deformity    Cardiovascular:   Regular rhythm, S1, S2 normal, no murmur, rub or gallop.   Abdomen:  Soft, non-tender, bowel sounds active all four quadrants,  no masses, no organomegaly   Extremities: no cyanosis or clubbing. No edema   Skin: Skin color, texture, turgor normal, no rashes or lesions.    Psychiatric: Normal affect, calm   Neurologic: Alert and oriented x 3, moving all four extremities.     Encounter Vitals  BP: 132/76  Pulse: 55  Resp: 16  Weight: 92.5 kg (204 lb)  Height: 175.3 cm (5' 9\")                                          Lab Results    Chemistry/lipid CBC Cardiac Enzymes/BNP/TSH/INR   Recent Labs   Lab Test 11/04/24  0802   CHOL 144   HDL 48   LDL 84   TRIG 60     Recent Labs   Lab Test 11/04/24  0802 10/26/23  0708 09/01/22  0714   LDL 84 94 69     Recent Labs   Lab Test 11/04/24  0802      POTASSIUM 4.1   CHLORIDE 104   CO2 27   *   BUN 9.0   CR 0.83   GFRESTIMATED >90   CLINT 9.2     Recent Labs   Lab Test 11/04/24  0802 10/26/23  0708 " "22  0714   CR 0.83 0.85 0.75     No results for input(s): \"A1C\" in the last 24140 hours.       Recent Labs   Lab Test 19  0742   WBC 8.2   HGB 15.6   HCT 47.2   MCV 92        Recent Labs   Lab Test 19  0742   HGB 15.6    No results for input(s): \"TROPONINI\" in the last 21892 hours.  No results for input(s): \"BNP\", \"NTBNPI\", \"NTBNP\" in the last 64191 hours.  No results for input(s): \"TSH\" in the last 62247 hours.  No results for input(s): \"INR\" in the last 99943 hours.     Medical History  Surgical History Family History Social History   Past Medical History:   Diagnosis Date     Arthritis 20    Results of MRI at [facility] 2020     Past Surgical History:   Procedure Laterality Date     ARTHROSCOPY KNEE      ,both knees     COLONOSCOPY           COLONOSCOPY  2013,Normal - follow up 10 years     COLONOSCOPY WITH CO2 INSUFFLATION N/A 2024    Procedure: Colonoscopy with CO2 insufflation;  Surgeon: Tucker Navarro MD;  Location: [facility] OR     TONSILLECTOMY, ADENOIDECTOMY, COMBINED      T & A     Father had heart attack in his 60s.  Brother has had stents in the 40s   Social History     Socioeconomic History     Marital status:      Spouse name: Not on file     Number of children: Not on file     Years of education: Not on file     Highest education level: Not on file   Occupational History     Occupation: OWNER   Tobacco Use     Smoking status: Former     Current packs/day: 0.00     Types: Cigarettes     Start date: 6/15/1972     Quit date: 1985     Years since quittin.9     Smokeless tobacco: Never   Vaping Use     Vaping status: Never Used   Substance and Sexual Activity     Alcohol use: No     Drug use: No     Types: Other     Comment: Drug use: No     Sexual activity: Not Currently     Partners: Female     Birth control/protection: None   Other Topics Concern     Parent/sibling w/ CABG, MI or angioplasty before 65F " 55M? Yes   Social History Narrative     with 4 children.  Works as a jeweler.    Patient is a former smoker.  quite at 29 yrs of age    Alcohol Use - no    Preload  7/9/2013     Social Drivers of Health     Financial Resource Strain: Low Risk  (11/4/2024)    Financial Resource Strain      Within the past 12 months, have you or your family members you live with been unable to get utilities (heat, electricity) when it was really needed?: No   Food Insecurity: Low Risk  (11/4/2024)    Food Insecurity      Within the past 12 months, did you worry that your food would run out before you got money to buy more?: No      Within the past 12 months, did the food you bought just not last and you didn t have money to get more?: No   Transportation Needs: Low Risk  (11/4/2024)    Transportation Needs      Within the past 12 months, has lack of transportation kept you from medical appointments, getting your medicines, non-medical meetings or appointments, work, or from getting things that you need?: No   Physical Activity: Insufficiently Active (11/4/2024)    Exercise Vital Sign      Days of Exercise per Week: 3 days      Minutes of Exercise per Session: 40 min   Stress: No Stress Concern Present (11/4/2024)    [employer] - Occupational Stress Questionnaire      Feeling of Stress : Not at all   Social Connections: Unknown (11/4/2024)    Social Connection and Isolation Panel [NHANES]      Frequency of Communication with Friends and Family: Not on file      Frequency of Social Gatherings with Friends and Family: Once a week      Attends Evangelical Services: Not on file      Active Member of Clubs or Organizations: Not on file      Attends Club or Organization Meetings: Not on file      Marital Status: Not on file   Interpersonal Safety: Low Risk  (11/5/2024)    Interpersonal Safety      Do you feel physically and emotionally safe where you currently live?: Yes      Within the past 12 months, have  you been hit, slapped, kicked or otherwise physically hurt by someone?: No      Within the past 12 months, have you been humiliated or emotionally abused in other ways by your partner or ex-partner?: No   Housing Stability: Low Risk  (11/4/2024)    Housing Stability      Do you have housing? : Yes      Are you worried about losing your housing?: No         Medications  Allergies   Current Outpatient Medications   Medication Sig Dispense Refill     amLODIPine (NORVASC) 5 MG tablet Take 1 tablet (5 mg) by mouth daily. New for blood pressure 90 tablet 1     atorvastatin (LIPITOR) 20 MG tablet Take 1 tablet (20 mg) by mouth daily. Replaces simvastatin (zocor) for cholesterol 90 tablet 1     fish oil-omega-3 fatty acids 1000 MG capsule        Glucosamine-Chondroitin 250-200 MG TABS        MAGNESIUM PO Take 1 tablet by mouth daily 75 mg       MULTIPLE VITAMINS-MINERALS PO        vitamin C (ASCORBIC ACID) 1000 MG TABS Take 1,000 mg by mouth daily       Vitamin D, Cholecalciferol, 10 MCG (400 UNIT) TABS         No Known Allergies                     Thank you for allowing me to participate in the care of your patient.      Sincerely,     Constantine Gaitan MD     [facility]  cc:   Piyush Pickett MD  [address] [place] [address]

## 2024-12-04 LAB
ATRIAL RATE - MUSE: 51 BPM
DIASTOLIC BLOOD PRESSURE - MUSE: NORMAL MMHG
INTERPRETATION ECG - MUSE: NORMAL
P AXIS - MUSE: 50 DEGREES
PR INTERVAL - MUSE: 160 MS
QRS DURATION - MUSE: 84 MS
QT - MUSE: 460 MS
QTC - MUSE: 423 MS
R AXIS - MUSE: 58 DEGREES
SYSTOLIC BLOOD PRESSURE - MUSE: NORMAL MMHG
T AXIS - MUSE: 68 DEGREES
VENTRICULAR RATE- MUSE: 51 BPM

## 2024-12-09 ENCOUNTER — OFFICE VISIT (OUTPATIENT)
Dept: FAMILY MEDICINE | Facility: CLINIC | Age: 69
End: 2024-12-09
Payer: COMMERCIAL

## 2024-12-09 VITALS
WEIGHT: 201 LBS | RESPIRATION RATE: 12 BRPM | HEIGHT: 69 IN | BODY MASS INDEX: 29.77 KG/M2 | SYSTOLIC BLOOD PRESSURE: 128 MMHG | HEART RATE: 61 BPM | TEMPERATURE: 96.3 F | OXYGEN SATURATION: 98 % | DIASTOLIC BLOOD PRESSURE: 69 MMHG

## 2024-12-09 DIAGNOSIS — I25.119 CORONARY ARTERY DISEASE INVOLVING NATIVE CORONARY ARTERY OF NATIVE HEART WITH ANGINA PECTORIS (H): Primary | ICD-10-CM

## 2024-12-09 DIAGNOSIS — E78.5 HYPERLIPIDEMIA LDL GOAL <130: ICD-10-CM

## 2024-12-09 DIAGNOSIS — R05.9 COUGH, UNSPECIFIED TYPE: ICD-10-CM

## 2024-12-09 DIAGNOSIS — I10 HYPERTENSION GOAL BP (BLOOD PRESSURE) < 140/90: ICD-10-CM

## 2024-12-09 PROCEDURE — 99214 OFFICE O/P EST MOD 30 MIN: CPT | Performed by: FAMILY MEDICINE

## 2024-12-09 PROCEDURE — G2211 COMPLEX E/M VISIT ADD ON: HCPCS | Performed by: FAMILY MEDICINE

## 2024-12-09 NOTE — PROGRESS NOTES
ASSESSMENT / PLAN:  (I25.119) Coronary artery disease involving native coronary artery of native heart with angina pectoris (H)  (primary encounter diagnosis)  Comment: no chest pain or shortness of breath   Plan: stress echo soon. Chest pain or shortness of breath to er.   Continue lipitor//exercise.     (E78.5) Hyperlipidemia LDL goal <130  Comment: imrpoving  Plan: continue lipitor.     (I10) Hypertension goal BP (blood pressure) < 140/90  Comment: stable  Plan: continue norvasc and self-monitor. Recheck in 6 months  Sooner if worse. Add hydrochlorothiazide if needed.       The longitudinal plan of care for the diagnosis(es)/condition(s) as documented were addressed during this visit. Due to the added complexity in care, I will continue to support Arnoldo in the subsequent management and with ongoing continuity of care.    (R05.9) Cough, unspecified type  Comment: resolving. Quit smoking almost 40 years ago.   Plan: Expected course and warning signs reviewed. Return to clinic if worsening cough/rib pain/etc. Call/email with questions/concerns          Subjective   Arnoldo is a 69 year old, presenting for the following health issues:  Follow Up, Recheck Medication, and Hypertension      Follow-up htn, high cholesterol and +calcium coronary scan -severe. Seen cardiology in  stess test.10 days. Started on norvasc last month and zocor 10mg changed to lipitor 20mg.   Ct showed right lower lobe - mild inflammatory/infectious process.  Cough improving overall. Some sneezing. No history asthma. Dry cough. No chest pain or rib.   Taking asa  No fevers or chills.   Home blood pressure 130/70   Quit smoking aobut 40 years ago.       12/9/2024    12:03 PM   Additional Questions   Roomed by Leni   Accompanied by self         12/9/2024    12:03 PM   Patient Reported Additional Medications   Patient reports taking the following new medications n/a     History of Present Illness       Hypertension: He presents for follow up of  "hypertension.  He does not check blood pressure  regularly outside of the clinic. Outside blood pressures have been over 140/90. He does not follow a low salt diet.     He eats 2-3 servings of fruits and vegetables daily.He consumes 0 sweetened beverage(s) daily.He exercises with enough effort to increase his heart rate 30 to 60 minutes per day.  He exercises with enough effort to increase his heart rate 5 days per week.   He is taking medications regularly.                     Objective    /69   Pulse 61   Temp (!) 96.3  F (35.7  C) (Tympanic)   Resp 12   Ht 1.753 m (5' 9\")   Wt 91.2 kg (201 lb)   SpO2 98%   BMI 29.68 kg/m      Physical Exam   GENERAL: alert and no distress  EYES: Eyes grossly normal to inspection, PERRL and conjunctivae and sclerae normal  HENT: ear canals and TM's normal, nose and mouth without ulcers or lesions  NECK: no adenopathy, no asymmetry, masses, or scars  RESP: lungs clear to auscultation - no rales, rhonchi or wheezes  CV: regular rate and rhythm, normal S1 S2, no S3 or S4, no murmur, click or rub, no peripheral edema   ABDOMEN: soft, nontender, no hepatosplenomegaly, no masses and bowel sounds normal  MS: no gross musculoskeletal defects noted, no edema  PSYCH: mentation appears normal, affect normal/bright            Signed Electronically by: Piyush Pickett MD    "

## 2025-02-01 DIAGNOSIS — I10 HYPERTENSION GOAL BP (BLOOD PRESSURE) < 140/90: ICD-10-CM

## 2025-02-03 RX ORDER — AMLODIPINE BESYLATE 5 MG/1
5 TABLET ORAL DAILY
Qty: 90 TABLET | Refills: 1 | OUTPATIENT
Start: 2025-02-03

## 2025-04-28 DIAGNOSIS — I10 HYPERTENSION GOAL BP (BLOOD PRESSURE) < 140/90: ICD-10-CM

## 2025-04-28 RX ORDER — AMLODIPINE BESYLATE 5 MG/1
5 TABLET ORAL DAILY
Qty: 90 TABLET | Refills: 1 | Status: SHIPPED | OUTPATIENT
Start: 2025-04-28

## 2025-05-21 DIAGNOSIS — E78.5 HYPERLIPIDEMIA LDL GOAL <130: ICD-10-CM

## 2025-05-21 DIAGNOSIS — Z82.49 FHX: EARLY CORONARY ARTERY DISEASE: ICD-10-CM

## 2025-05-21 RX ORDER — ATORVASTATIN CALCIUM 20 MG/1
20 TABLET, FILM COATED ORAL DAILY
Qty: 90 TABLET | Refills: 1 | Status: SHIPPED | OUTPATIENT
Start: 2025-05-21

## 2025-08-13 DIAGNOSIS — I10 HYPERTENSION GOAL BP (BLOOD PRESSURE) < 140/90: ICD-10-CM

## 2025-08-13 RX ORDER — AMLODIPINE BESYLATE 5 MG/1
5 TABLET ORAL DAILY
Qty: 90 TABLET | Refills: 1 | OUTPATIENT
Start: 2025-08-13

## (undated) DEVICE — KIT ENDO FIRST STEP DISINFECTANT 200ML W/POUCH EP-4

## (undated) DEVICE — PAD CHUX UNDERPAD 23X24" 7136